# Patient Record
Sex: FEMALE | Race: WHITE | NOT HISPANIC OR LATINO
[De-identification: names, ages, dates, MRNs, and addresses within clinical notes are randomized per-mention and may not be internally consistent; named-entity substitution may affect disease eponyms.]

---

## 2021-12-20 ENCOUNTER — APPOINTMENT (OUTPATIENT)
Dept: NEUROSURGERY | Facility: CLINIC | Age: 31
End: 2021-12-20
Payer: COMMERCIAL

## 2021-12-20 VITALS
OXYGEN SATURATION: 99 % | DIASTOLIC BLOOD PRESSURE: 87 MMHG | SYSTOLIC BLOOD PRESSURE: 126 MMHG | HEIGHT: 61 IN | WEIGHT: 150 LBS | TEMPERATURE: 98.3 F | HEART RATE: 88 BPM | BODY MASS INDEX: 28.32 KG/M2

## 2021-12-20 DIAGNOSIS — Z78.9 OTHER SPECIFIED HEALTH STATUS: ICD-10-CM

## 2021-12-20 DIAGNOSIS — Z72.0 TOBACCO USE: ICD-10-CM

## 2021-12-20 DIAGNOSIS — Z80.3 FAMILY HISTORY OF MALIGNANT NEOPLASM OF BREAST: ICD-10-CM

## 2021-12-20 DIAGNOSIS — Z87.891 PERSONAL HISTORY OF NICOTINE DEPENDENCE: ICD-10-CM

## 2021-12-20 PROCEDURE — 99205 OFFICE O/P NEW HI 60 MIN: CPT

## 2021-12-20 RX ORDER — CYCLOBENZAPRINE HYDROCHLORIDE 7.5 MG/1
TABLET, FILM COATED ORAL
Refills: 0 | Status: ACTIVE | COMMUNITY

## 2021-12-20 NOTE — REASON FOR VISIT
[New Patient Visit] : a new patient visit [Referred By: _________] : Patient was referred by PHILOMENA

## 2021-12-20 NOTE — REVIEW OF SYSTEMS
[Leg Weakness] : leg weakness [Numbness] : numbness [Tingling] : tingling [Abnormal Sensation] : an abnormal sensation [Difficulty Walking] : difficulty walking [Anxiety] : anxiety [Depression] : depression [Negative] : Heme/Lymph

## 2021-12-22 ENCOUNTER — NON-APPOINTMENT (OUTPATIENT)
Age: 31
End: 2021-12-22

## 2021-12-22 NOTE — HISTORY OF PRESENT ILLNESS
[FreeTextEntry1] : "Sacral chordoma" [de-identified] : Hawa Booth is a pleasant right handed 31 year old lady who presents for consultation regarding lumbar spine lesion.  Pt states that she fell while going up stairs last October and hit her face.  Pt thinks that she lost consciousness for a brief period.  She noticed that she had left lower back pain, left buttock pain, left leg weakness and numbness after the fall.\par \par She is under the care of Neurologist Dr. Workman - Neurologist and is taking Cyclobenzaprine prn for pain 6/10.  Pt ambulates with cane assist.\par \par Pt has been doing physical therapy and reports that the pain is improving.\par \par Denies pain shooting down the leg.\par \par No biopsy of the lesion done.\par \par Current pain is related to high intensity zone. tear in lumbar spine.

## 2021-12-22 NOTE — ASSESSMENT
[FreeTextEntry1] : IMPRESSION:\par 1. Lumbar spine lesion found on imaging after fall in October 2021.\par \par \par \par PLAN:\par 1. Biopsy with Dr. Zhao for diagnosis.\par 2. Okay to continue with PT sessions - advised to use common sense with activities.\par 3. F/U after pathology result is available.\par 4. Advised pt not to take any NSAids or Aspirin 10 days prior to biopsy with Dr. Zhao.  She will take Tylenol prn.

## 2021-12-22 NOTE — PHYSICAL EXAM
[General Appearance - Alert] : alert [General Appearance - In No Acute Distress] : in no acute distress [General Appearance - Well Nourished] : well nourished [Oriented To Time, Place, And Person] : oriented to person, place, and time [Impaired Insight] : insight and judgment were intact [Affect] : the affect was normal [Memory Recent] : recent memory was not impaired [Person] : oriented to person [Place] : oriented to place [Time] : oriented to time [Cranial Nerves Optic (II)] : visual acuity intact bilaterally,  pupils equal round and reactive to light [Cranial Nerves Oculomotor (III)] : extraocular motion intact [Cranial Nerves Trigeminal (V)] : facial sensation intact symmetrically [Cranial Nerves Facial (VII)] : face symmetrical [Cranial Nerves Vestibulocochlear (VIII)] : hearing was intact bilaterally [Cranial Nerves Glossopharyngeal (IX)] : tongue and palate midline [Cranial Nerves Accessory (XI - Cranial And Spinal)] : head turning and shoulder shrug symmetric [Cranial Nerves Hypoglossal (XII)] : there was no tongue deviation with protrusion [Motor Handedness Right-Handed] : the patient is right hand dominant [Sclera] : the sclera and conjunctiva were normal [PERRL With Normal Accommodation] : pupils were equal in size, round, reactive to light, with normal accommodation [Extraocular Movements] : extraocular movements were intact [Outer Ear] : the ears and nose were normal in appearance [Hearing Threshold Finger Rub Not [place]] : hearing was normal [Neck Appearance] : the appearance of the neck was normal [Respiration, Rhythm And Depth] : normal respiratory rhythm and effort [Exaggerated Use Of Accessory Muscles For Inspiration] : no accessory muscle use [Heart Rate And Rhythm] : heart rate was normal and rhythm regular [Involuntary Movements] : no involuntary movements were seen [Skin Color & Pigmentation] : normal skin color and pigmentation [] : no rash [FreeTextEntry6] : ambulates with cane assist [FreeTextEntry1] : ambulates with cane assist

## 2021-12-27 ENCOUNTER — APPOINTMENT (OUTPATIENT)
Dept: INTERVENTIONAL RADIOLOGY/VASCULAR | Facility: CLINIC | Age: 31
End: 2021-12-27

## 2022-01-18 ENCOUNTER — APPOINTMENT (OUTPATIENT)
Dept: INTERVENTIONAL RADIOLOGY/VASCULAR | Facility: CLINIC | Age: 32
End: 2022-01-18

## 2022-03-28 ENCOUNTER — APPOINTMENT (OUTPATIENT)
Dept: INTERVENTIONAL RADIOLOGY/VASCULAR | Facility: CLINIC | Age: 32
End: 2022-03-28
Payer: COMMERCIAL

## 2022-03-28 DIAGNOSIS — Z92.29 PERSONAL HISTORY OF OTHER DRUG THERAPY: ICD-10-CM

## 2022-03-28 DIAGNOSIS — Z01.818 ENCOUNTER FOR OTHER PREPROCEDURAL EXAMINATION: ICD-10-CM

## 2022-03-28 DIAGNOSIS — Z86.16 PERSONAL HISTORY OF COVID-19: ICD-10-CM

## 2022-03-28 DIAGNOSIS — R93.89 ABNORMAL FINDINGS ON DIAGNOSTIC IMAGING OF OTHER SPECIFIED BODY STRUCTURES: ICD-10-CM

## 2022-03-28 PROCEDURE — 99443: CPT

## 2022-03-28 RX ORDER — QUETIAPINE 200 MG/1
200 TABLET, FILM COATED ORAL
Refills: 0 | Status: ACTIVE | COMMUNITY

## 2022-03-28 RX ORDER — PANTOPRAZOLE SODIUM 40 MG/1
40 GRANULE, DELAYED RELEASE ORAL
Refills: 0 | Status: ACTIVE | COMMUNITY

## 2022-04-20 ENCOUNTER — RESULT REVIEW (OUTPATIENT)
Age: 32
End: 2022-04-20

## 2022-04-20 ENCOUNTER — OUTPATIENT (OUTPATIENT)
Dept: OUTPATIENT SERVICES | Facility: HOSPITAL | Age: 32
LOS: 1 days | End: 2022-04-20
Payer: COMMERCIAL

## 2022-04-20 ENCOUNTER — TRANSCRIPTION ENCOUNTER (OUTPATIENT)
Age: 32
End: 2022-04-20

## 2022-04-20 VITALS
DIASTOLIC BLOOD PRESSURE: 93 MMHG | HEART RATE: 96 BPM | HEIGHT: 61 IN | SYSTOLIC BLOOD PRESSURE: 123 MMHG | RESPIRATION RATE: 18 BRPM | TEMPERATURE: 98 F | WEIGHT: 149.91 LBS | OXYGEN SATURATION: 99 %

## 2022-04-20 VITALS
RESPIRATION RATE: 18 BRPM | DIASTOLIC BLOOD PRESSURE: 90 MMHG | OXYGEN SATURATION: 99 % | SYSTOLIC BLOOD PRESSURE: 121 MMHG | HEART RATE: 85 BPM

## 2022-04-20 DIAGNOSIS — Z98.89 OTHER SPECIFIED POSTPROCEDURAL STATES: Chronic | ICD-10-CM

## 2022-04-20 DIAGNOSIS — M89.9 DISORDER OF BONE, UNSPECIFIED: ICD-10-CM

## 2022-04-20 PROCEDURE — 88341 IMHCHEM/IMCYTCHM EA ADD ANTB: CPT

## 2022-04-20 PROCEDURE — 20225 BONE BIOPSY TROCAR/NDL DEEP: CPT | Mod: 53

## 2022-04-20 PROCEDURE — 88342 IMHCHEM/IMCYTCHM 1ST ANTB: CPT

## 2022-04-20 PROCEDURE — 20225 BONE BIOPSY TROCAR/NDL DEEP: CPT

## 2022-04-20 PROCEDURE — 77012 CT SCAN FOR NEEDLE BIOPSY: CPT

## 2022-04-20 PROCEDURE — 88341 IMHCHEM/IMCYTCHM EA ADD ANTB: CPT | Mod: 26

## 2022-04-20 PROCEDURE — 77012 CT SCAN FOR NEEDLE BIOPSY: CPT | Mod: 26

## 2022-04-20 PROCEDURE — 88342 IMHCHEM/IMCYTCHM 1ST ANTB: CPT | Mod: 26

## 2022-04-20 PROCEDURE — C1830: CPT

## 2022-04-20 RX ORDER — HYDROMORPHONE HYDROCHLORIDE 2 MG/ML
1 INJECTION INTRAMUSCULAR; INTRAVENOUS; SUBCUTANEOUS
Refills: 0 | Status: DISCONTINUED | OUTPATIENT
Start: 2022-04-20 | End: 2022-04-20

## 2022-04-20 RX ORDER — ONDANSETRON 8 MG/1
4 TABLET, FILM COATED ORAL ONCE
Refills: 0 | Status: DISCONTINUED | OUTPATIENT
Start: 2022-04-20 | End: 2022-05-04

## 2022-04-20 RX ORDER — HYDROMORPHONE HYDROCHLORIDE 2 MG/ML
0.5 INJECTION INTRAMUSCULAR; INTRAVENOUS; SUBCUTANEOUS
Refills: 0 | Status: DISCONTINUED | OUTPATIENT
Start: 2022-04-20 | End: 2022-04-20

## 2022-04-20 RX ADMIN — HYDROMORPHONE HYDROCHLORIDE 1 MILLIGRAM(S): 2 INJECTION INTRAMUSCULAR; INTRAVENOUS; SUBCUTANEOUS at 11:45

## 2022-04-20 RX ADMIN — HYDROMORPHONE HYDROCHLORIDE 1 MILLIGRAM(S): 2 INJECTION INTRAMUSCULAR; INTRAVENOUS; SUBCUTANEOUS at 12:02

## 2022-04-20 NOTE — ASU DISCHARGE PLAN (ADULT/PEDIATRIC) - NURSING INSTRUCTIONS
Please feel free to contact us at [phone] if any problems arise. After 6PM, Monday through Friday, on weekends and on holidays, please call [phone] and ask for the radiology resident on call to be paged.

## 2022-04-20 NOTE — ASU DISCHARGE PLAN (ADULT/PEDIATRIC) - NS MD DC FALL RISK RISK
For information on Fall & Injury Prevention, visit: [email or web address] OR  [email or web address] OR  https://www.cdc.gov/steadi/patient.html

## 2022-04-20 NOTE — ASU PATIENT PROFILE, ADULT - FALL HARM RISK - UNIVERSAL INTERVENTIONS
Bed in lowest position, wheels locked, appropriate side rails in place/Call bell, personal items and telephone in reach/Instruct patient to call for assistance before getting out of bed or chair/Non-slip footwear when patient is out of bed/[place] to call system/Physically safe environment - no spills, clutter or unnecessary equipment/Purposeful Proactive Rounding/Room/bathroom lighting operational, light cord in reach

## 2022-04-20 NOTE — ASU DISCHARGE PLAN (ADULT/PEDIATRIC) - ASU DC SPECIAL INSTRUCTIONSFT
Biopsy Discharge    Discharge Instructions  - You have had a biopsy of lumbosacral bone.   - You may shower in 24 hours. No soaking or swimming until the site is completely healed.  - Keep the area covered and dry for the next 24 hours.  - Do not perform any heavy lifting for the next few days or until the site is healed.  - You may resume your normal diet.  - You may resume your normal medications however you should wait 48 hours before restarting aspirin, plavix, or blood thinners.  - It is normal to experience some pain over the site for the next few days. You may take apply ice to the area (20 minutes on, 20 minutes off) and take Tylenol for that pain. Do not take more frequently than every 6 hours and do not exceed more than 3000mg of Tylenol in a 24 hour period.    - You were given conscious sedation which may make you drowsy, therefore you need someone to stay with you until the morning following the procedure.  - Do not drive, engage in heavy lifting or strenuous activity, or drink any alcoholic beverages for the next 24 hours.   - You may resume normal activity in 24 hours.    Notify your primary physician and/or Interventional Radiology IMMEDIATELY if you experience any of the following       - Fever of 101F or 38C       - Chills or Rigors/ Shakes       - Swelling and/or Redness in the area around the biopsy site       - Worsening Pain       - Blood soaked bandages or worsening bleeding       - Lightheadedness and/or dizziness upon standing       - Chest Pain/ Tightness       - Shortness of Breath       - Difficulty walking    If you have a problem that you believe requires IMMEDIATE attention, please go to your NEAREST Emergency Room. If you believe your problem can safely wait until you speak to a physician, please call Interventional Radiology for any concerns.    During Normal Weekday Business Hours- You can contact the Interventional Radiology department during normal business hours via telephone.  During Evenings and Weekends- If you need to contact Interventional Radiology during off hours, do so by calling the hospital and requesting to be connected to the Interventional Radiologist on call.

## 2022-04-25 DIAGNOSIS — M89.9 DISORDER OF BONE, UNSPECIFIED: ICD-10-CM

## 2022-05-02 ENCOUNTER — TRANSCRIPTION ENCOUNTER (OUTPATIENT)
Age: 32
End: 2022-05-02

## 2022-05-04 LAB — NON-GYNECOLOGICAL CYTOLOGY STUDY: SIGNIFICANT CHANGE UP

## 2022-05-05 ENCOUNTER — APPOINTMENT (OUTPATIENT)
Dept: NEUROSURGERY | Facility: CLINIC | Age: 32
End: 2022-05-05

## 2022-05-16 ENCOUNTER — APPOINTMENT (OUTPATIENT)
Dept: NEUROSURGERY | Facility: CLINIC | Age: 32
End: 2022-05-16
Payer: SELF-PAY

## 2022-05-16 ENCOUNTER — OUTPATIENT (OUTPATIENT)
Dept: OUTPATIENT SERVICES | Facility: HOSPITAL | Age: 32
LOS: 1 days | End: 2022-05-16
Payer: COMMERCIAL

## 2022-05-16 VITALS
DIASTOLIC BLOOD PRESSURE: 94 MMHG | HEART RATE: 89 BPM | RESPIRATION RATE: 17 BRPM | HEIGHT: 60.98 IN | WEIGHT: 149.91 LBS | TEMPERATURE: 98 F | OXYGEN SATURATION: 99 % | SYSTOLIC BLOOD PRESSURE: 130 MMHG

## 2022-05-16 VITALS
OXYGEN SATURATION: 99 % | BODY MASS INDEX: 28.32 KG/M2 | HEART RATE: 92 BPM | DIASTOLIC BLOOD PRESSURE: 88 MMHG | TEMPERATURE: 98.3 F | HEIGHT: 61 IN | WEIGHT: 150 LBS | SYSTOLIC BLOOD PRESSURE: 143 MMHG

## 2022-05-16 DIAGNOSIS — C41.4 MALIGNANT NEOPLASM OF PELVIC BONES, SACRUM AND COCCYX: ICD-10-CM

## 2022-05-16 DIAGNOSIS — Z01.818 ENCOUNTER FOR OTHER PREPROCEDURAL EXAMINATION: ICD-10-CM

## 2022-05-16 DIAGNOSIS — Z98.89 OTHER SPECIFIED POSTPROCEDURAL STATES: Chronic | ICD-10-CM

## 2022-05-16 PROCEDURE — 85027 COMPLETE CBC AUTOMATED: CPT

## 2022-05-16 PROCEDURE — 86850 RBC ANTIBODY SCREEN: CPT

## 2022-05-16 PROCEDURE — 80048 BASIC METABOLIC PNL TOTAL CA: CPT

## 2022-05-16 PROCEDURE — 86901 BLOOD TYPING SEROLOGIC RH(D): CPT

## 2022-05-16 PROCEDURE — G0463: CPT

## 2022-05-16 PROCEDURE — 87640 STAPH A DNA AMP PROBE: CPT

## 2022-05-16 PROCEDURE — 99213 OFFICE O/P EST LOW 20 MIN: CPT

## 2022-05-16 PROCEDURE — 87641 MR-STAPH DNA AMP PROBE: CPT

## 2022-05-16 PROCEDURE — 86900 BLOOD TYPING SEROLOGIC ABO: CPT

## 2022-05-16 RX ORDER — CEFAZOLIN SODIUM 1 G
2000 VIAL (EA) INJECTION ONCE
Refills: 0 | Status: DISCONTINUED | OUTPATIENT
Start: 2022-06-02 | End: 2022-06-03

## 2022-05-16 RX ORDER — ALPRAZOLAM 0.25 MG
0 TABLET ORAL
Qty: 0 | Refills: 0 | DISCHARGE

## 2022-05-16 NOTE — H&P PST ADULT - FALL HARM RISK - UNIVERSAL INTERVENTIONS
Bed in lowest position, wheels locked, appropriate side rails in place/Call bell, personal items and telephone in reach/Instruct patient to call for assistance before getting out of bed or chair/Non-slip footwear when patient is out of bed/[place] to call system/Physically safe environment - no spills, clutter or unnecessary equipment/Purposeful Proactive Rounding/Room/bathroom lighting operational, light cord in reach

## 2022-05-16 NOTE — H&P PST ADULT - NSICDXPASTMEDICALHX_GEN_ALL_CORE_FT
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

## 2022-05-16 NOTE — H&P PST ADULT - NS PRO LAST MENSTRUAL DATE
3 weeks ago Niacinamide Counseling: I recommended taking niacin or niacinamide, also know as vitamin B3, twice daily. Recent evidence suggests that taking vitamin B3 (500 mg twice daily) can reduce the risk of actinic keratoses and non-melanoma skin cancers. Side effects of vitamin B3 include flushing and headache.

## 2022-05-16 NOTE — H&P PST ADULT - ASSESSMENT
CAPRINI SCORE [CLOT]    AGE RELATED RISK FACTORS                                                       MOBILITY RELATED FACTORS  [ ] Age 41-60 years                                            (1 Point)                  [ ] Bed rest                                                        (1 Point)  [ ] Age: 61-74 years                                           (2 Points)                 [ ] Plaster cast                                                   (2 Points)  [ ] Age= 75 years                                              (3 Points)                 [ ] Bed bound for more than 72 hours                 (2 Points)    DISEASE RELATED RISK FACTORS                                               GENDER SPECIFIC FACTORS  [ ] Edema in the lower extremities                       (1 Point)                  [ ] Pregnancy                                                     (1 Point)  [ ] Varicose veins                                               (1 Point)                  [ ] Post-partum < 6 weeks                                   (1 Point)             [ x] BMI > 25 Kg/m2                                            (1 Point)                  [ ] Hormonal therapy  or oral contraception          (1 Point)                 [ ] Sepsis (in the previous month)                        (1 Point)                  [ ] History of pregnancy complications                 (1 point)  [ ] Pneumonia or serious lung disease                                               [ ] Unexplained or recurrent                     (1 Point)           (in the previous month)                               (1 Point)  [ ] Abnormal pulmonary function test                     (1 Point)                 SURGERY RELATED RISK FACTORS  [ ] Acute myocardial infarction                              (1 Point)                 [ ]  Section                                             (1 Point)  [ ] Congestive heart failure (in the previous month)  (1 Point)               [ ] Minor surgery                                                  (1 Point)   [ ] Inflammatory bowel disease                             (1 Point)                 [ ] Arthroscopic surgery                                        (2 Points)  [ ] Central venous access                                      (2 Points)                [ x] General surgery lasting more than 45 minutes   (2 Points)       [ ] Stroke (in the previous month)                          (5 Points)               [ ] Elective arthroplasty                                         (5 Points)                                                                                                                                               HEMATOLOGY RELATED FACTORS                                                 TRAUMA RELATED RISK FACTORS  [ ] Prior episodes of VTE                                     (3 Points)                [ ] Fracture of the hip, pelvis, or leg                       (5 Points)  [ ] Positive family history for VTE                         (3 Points)                 [ ] Acute spinal cord injury (in the previous month)  (5 Points)  [ ] Prothrombin 92373 A                                     (3 Points)                 [ ] Paralysis  (less than 1 month)                             (5 Points)  [ ] Factor V Leiden                                             (3 Points)                  [ ] Multiple Trauma within 1 month                        (5 Points)  [ ] Lupus anticoagulants                                     (3 Points)                                                           [ ] Anticardiolipin antibodies                               (3 Points)                                                       [ ] High homocysteine in the blood                      (3 Points)                                             [ ] Other congenital or acquired thrombophilia      (3 Points)                                                [ ] Heparin induced thrombocytopenia                  (3 Points)                                          Total Score [   3       ]    Caprini Score 0 - 2:  Low Risk, No VTE Prophylaxis required for most patients, encourage ambulation  Caprini Score 3 - 6:  At Risk, pharmacologic VTE prophylaxis is indicated for most patients (in the absence of a contraindication)  Caprini Score Greater than or = 7:  High Risk, pharmacologic VTE prophylaxis is indicated for most patients (in the absence of a contraindication)

## 2022-05-16 NOTE — H&P PST ADULT - PROBLEM SELECTOR PLAN 1
mid sacral removal of tumor  resection with plastic closure  back reconstruction  with muscle flaps   placement of mesh

## 2022-05-18 ENCOUNTER — RESULT REVIEW (OUTPATIENT)
Age: 32
End: 2022-05-18

## 2022-05-18 PROBLEM — C41.4: Chronic | Status: ACTIVE | Noted: 2022-05-16

## 2022-05-18 PROBLEM — M51.26 OTHER INTERVERTEBRAL DISC DISPLACEMENT, LUMBAR REGION: Chronic | Status: ACTIVE | Noted: 2022-05-16
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
## 2022-05-18 PROBLEM — U07.1 COVID-19: Chronic | Status: ACTIVE | Noted: 2022-05-16

## 2022-05-18 NOTE — ASSESSMENT
[FreeTextEntry1] : Impression:31 year old female pre op for resection of sacral chordoma June 2, 2022.\par Patient and her mother asked appropriate questions regarding surgical procedure, prognosis, recovery and precautions.\par All questions were answered and patient and her mother state an appropriate understanding of answers given.\par \par All risks and benefits of the surgery were discussed in detail and included: bowel and bladder dysfunction, wound complications (infection, breakdown), nerve damage and bleeding.\par \par \par \par Plan:\par \par 1) Pre-op MRI without contrast (authorized)\par \par 2) Pre-op CT scan without contrast (Authorized)\par \par 3) Pre-op testing  scheduled for 5/16 2022 @6:30 PM\par \par 4) COVID testing scheduled for 5/28/2022

## 2022-05-18 NOTE — PHYSICAL EXAM
[General Appearance - Alert] : alert [General Appearance - In No Acute Distress] : in no acute distress [Oriented To Time, Place, And Person] : oriented to person, place, and time [Impaired Insight] : insight and judgment were intact [Affect] : the affect was normal [Person] : oriented to person [Place] : oriented to place [Time] : oriented to time [Short Term Intact] : short term memory intact [Remote Intact] : remote memory intact [Span Intact] : the attention span was normal [Concentration Intact] : normal concentrating ability [Fluency] : fluency intact [Comprehension] : comprehension intact [Current Events] : adequate knowledge of current events [Past History] : adequate knowledge of personal past history [Vocabulary] : adequate range of vocabulary [Cranial Nerves Optic (II)] : visual acuity intact bilaterally,  pupils equal round and reactive to light [Cranial Nerves Oculomotor (III)] : extraocular motion intact [Cranial Nerves Trigeminal (V)] : facial sensation intact symmetrically [Cranial Nerves Facial (VII)] : face symmetrical [Cranial Nerves Vestibulocochlear (VIII)] : hearing was intact bilaterally [Cranial Nerves Glossopharyngeal (IX)] : tongue and palate midline [Cranial Nerves Accessory (XI - Cranial And Spinal)] : head turning and shoulder shrug symmetric [Cranial Nerves Hypoglossal (XII)] : there was no tongue deviation with protrusion [Motor Tone] : muscle tone was normal in all four extremities [Motor Strength] : muscle strength was normal in all four extremities [No Muscle Atrophy] : normal bulk in all four extremities [Sensation Tactile Decrease] : light touch was intact [Abnormal Walk] : normal gait [Balance] : balance was intact [2+] : Patella left 2+ [No Visual Abnormalities] : no visible abnormailities [No Tenderness to Palpation] : no spine tenderness on palpation [Full ROM] : full ROM [No Pain with ROM] : no pain with motion in any direction [] : negative on the right [Normal] : normal [Intact] : all reflexes within normal limits bilaterally [Past-pointing] : there was no past-pointing [Tremor] : no tremor present [[place]'s] : neck flexion did not produce tingling down the spine/arms [Spurling's - Opposite Side] : Negative Spurling's on opposite side [Spurling's Same Side] : Negative Spurling's on same side [Straight-Leg Raise Test - Left] : straight leg raise of the left leg was negative [Straight-Leg Raise Test - Right] : straight leg raise  of the right leg was negative

## 2022-05-18 NOTE — ADDENDUM
[FreeTextEntry1] : Given the final pathology: documented as chordoma, Dr Thompson discussed the planned surgical procedure as perfected by him. The agreed upon complex procedure will be a mid sacral removal of tumor with en-bloc with total resection including wound closure by plastic surgery and neuro monitoring.

## 2022-05-18 NOTE — REASON FOR VISIT
[Follow-Up: _____] : a [unfilled] follow-up visit [Parent] : parent [FreeTextEntry1] : 31 year female here today to discuss surgical options of her lumbar spine lesion. This was discovered after a fall last October, in which she lost consciousness for a brief period of time but has residual left sided low back pain and left leg weakness.\par \par She has had all of the necessary testing and comes back for surgical planning.\par \par Today she notes no residual effects. No numbness no tingling and no pain.\par \par \par

## 2022-05-23 ENCOUNTER — APPOINTMENT (OUTPATIENT)
Dept: CT IMAGING | Facility: CLINIC | Age: 32
End: 2022-05-23
Payer: COMMERCIAL

## 2022-05-23 ENCOUNTER — APPOINTMENT (OUTPATIENT)
Dept: MRI IMAGING | Facility: CLINIC | Age: 32
End: 2022-05-23
Payer: COMMERCIAL

## 2022-05-23 PROCEDURE — 72128 CT CHEST SPINE W/O DYE: CPT

## 2022-05-23 PROCEDURE — 72125 CT NECK SPINE W/O DYE: CPT

## 2022-05-23 PROCEDURE — A9585: CPT | Mod: JW

## 2022-05-23 PROCEDURE — 72131 CT LUMBAR SPINE W/O DYE: CPT

## 2022-05-23 PROCEDURE — 71250 CT THORAX DX C-: CPT

## 2022-05-23 PROCEDURE — 72197 MRI PELVIS W/O & W/DYE: CPT

## 2022-05-25 RX ORDER — METRONIDAZOLE 500 MG/1
500 TABLET ORAL
Qty: 3 | Refills: 0 | Status: ACTIVE | COMMUNITY
Start: 2022-05-25 | End: 1900-01-01

## 2022-05-25 RX ORDER — NEOMYCIN SULFATE 500 MG/1
500 TABLET ORAL
Qty: 6 | Refills: 0 | Status: ACTIVE | COMMUNITY
Start: 2022-05-25 | End: 1900-01-01

## 2022-05-25 RX ORDER — POLYETHYLENE GLYCOL 3350 17 G/17G
17 POWDER, FOR SOLUTION ORAL
Qty: 8 | Refills: 0 | Status: ACTIVE | COMMUNITY
Start: 2022-05-25 | End: 1900-01-01

## 2022-05-29 ENCOUNTER — NON-APPOINTMENT (OUTPATIENT)
Age: 32
End: 2022-05-29

## 2022-06-01 ENCOUNTER — NON-APPOINTMENT (OUTPATIENT)
Age: 32
End: 2022-06-01

## 2022-06-02 ENCOUNTER — RESULT REVIEW (OUTPATIENT)
Age: 32
End: 2022-06-02

## 2022-06-02 ENCOUNTER — APPOINTMENT (OUTPATIENT)
Dept: NEUROSURGERY | Facility: CLINIC | Age: 32
End: 2022-06-02

## 2022-06-02 ENCOUNTER — APPOINTMENT (OUTPATIENT)
Dept: NEUROSURGERY | Facility: HOSPITAL | Age: 32
End: 2022-06-02
Payer: COMMERCIAL

## 2022-06-02 ENCOUNTER — APPOINTMENT (OUTPATIENT)
Dept: PLASTIC SURGERY | Facility: HOSPITAL | Age: 32
End: 2022-06-02

## 2022-06-02 ENCOUNTER — TRANSCRIPTION ENCOUNTER (OUTPATIENT)
Age: 32
End: 2022-06-02

## 2022-06-02 ENCOUNTER — INPATIENT (INPATIENT)
Facility: HOSPITAL | Age: 32
LOS: 6 days | Discharge: HOME | DRG: 460 | End: 2022-06-09
Attending: NEUROLOGICAL SURGERY | Admitting: NEUROLOGICAL SURGERY
Payer: COMMERCIAL

## 2022-06-02 VITALS
SYSTOLIC BLOOD PRESSURE: 119 MMHG | OXYGEN SATURATION: 98 % | HEART RATE: 96 BPM | WEIGHT: 149.91 LBS | TEMPERATURE: 98 F | DIASTOLIC BLOOD PRESSURE: 88 MMHG | RESPIRATION RATE: 18 BRPM | HEIGHT: 60.98 IN

## 2022-06-02 DIAGNOSIS — C41.4 MALIGNANT NEOPLASM OF PELVIC BONES, SACRUM AND COCCYX: ICD-10-CM

## 2022-06-02 DIAGNOSIS — Z98.89 OTHER SPECIFIED POSTPROCEDURAL STATES: Chronic | ICD-10-CM

## 2022-06-02 LAB
ANION GAP SERPL CALC-SCNC: 13 MMOL/L — SIGNIFICANT CHANGE UP (ref 5–17)
BASOPHILS # BLD AUTO: 0.01 K/UL — SIGNIFICANT CHANGE UP (ref 0–0.2)
BASOPHILS NFR BLD AUTO: 0.1 % — SIGNIFICANT CHANGE UP (ref 0–2)
BUN SERPL-MCNC: 6 MG/DL — LOW (ref 7–23)
CALCIUM SERPL-MCNC: 8.6 MG/DL — SIGNIFICANT CHANGE UP (ref 8.4–10.5)
CHLORIDE SERPL-SCNC: 103 MMOL/L — SIGNIFICANT CHANGE UP (ref 96–108)
CO2 SERPL-SCNC: 21 MMOL/L — LOW (ref 22–31)
CREAT SERPL-MCNC: 0.65 MG/DL — SIGNIFICANT CHANGE UP (ref 0.5–1.3)
EGFR: 121 ML/MIN/1.73M2 — SIGNIFICANT CHANGE UP
EOSINOPHIL # BLD AUTO: 0 K/UL — SIGNIFICANT CHANGE UP (ref 0–0.5)
EOSINOPHIL NFR BLD AUTO: 0 % — SIGNIFICANT CHANGE UP (ref 0–6)
GAS PNL BLDA: SIGNIFICANT CHANGE UP
GLUCOSE SERPL-MCNC: 130 MG/DL — HIGH (ref 70–99)
HCT VFR BLD CALC: 27.5 % — LOW (ref 34.5–45)
HGB BLD-MCNC: 9.2 G/DL — LOW (ref 11.5–15.5)
IMM GRANULOCYTES NFR BLD AUTO: 0.6 % — SIGNIFICANT CHANGE UP (ref 0–1.5)
LYMPHOCYTES # BLD AUTO: 0.88 K/UL — LOW (ref 1–3.3)
LYMPHOCYTES # BLD AUTO: 6.5 % — LOW (ref 13–44)
MCHC RBC-ENTMCNC: 30.8 PG — SIGNIFICANT CHANGE UP (ref 27–34)
MCHC RBC-ENTMCNC: 33.5 GM/DL — SIGNIFICANT CHANGE UP (ref 32–36)
MCV RBC AUTO: 92 FL — SIGNIFICANT CHANGE UP (ref 80–100)
MONOCYTES # BLD AUTO: 0.34 K/UL — SIGNIFICANT CHANGE UP (ref 0–0.9)
MONOCYTES NFR BLD AUTO: 2.5 % — SIGNIFICANT CHANGE UP (ref 2–14)
NEUTROPHILS # BLD AUTO: 12.27 K/UL — HIGH (ref 1.8–7.4)
NEUTROPHILS NFR BLD AUTO: 90.3 % — HIGH (ref 43–77)
NRBC # BLD: 0 /100 WBCS — SIGNIFICANT CHANGE UP (ref 0–0)
PLATELET # BLD AUTO: 319 K/UL — SIGNIFICANT CHANGE UP (ref 150–400)
POTASSIUM SERPL-MCNC: 4.3 MMOL/L — SIGNIFICANT CHANGE UP (ref 3.5–5.3)
POTASSIUM SERPL-SCNC: 4.3 MMOL/L — SIGNIFICANT CHANGE UP (ref 3.5–5.3)
RBC # BLD: 2.99 M/UL — LOW (ref 3.8–5.2)
RBC # FLD: 12.3 % — SIGNIFICANT CHANGE UP (ref 10.3–14.5)
SODIUM SERPL-SCNC: 137 MMOL/L — SIGNIFICANT CHANGE UP (ref 135–145)
WBC # BLD: 13.58 K/UL — HIGH (ref 3.8–10.5)
WBC # FLD AUTO: 13.58 K/UL — HIGH (ref 3.8–10.5)

## 2022-06-02 PROCEDURE — 72020 X-RAY EXAM OF SPINE 1 VIEW: CPT | Mod: 26

## 2022-06-02 PROCEDURE — 61783 SCAN PROC SPINAL: CPT

## 2022-06-02 PROCEDURE — 64999H: CUSTOM | Mod: 82,22

## 2022-06-02 PROCEDURE — 64999H: CUSTOM | Mod: 22

## 2022-06-02 PROCEDURE — 27280 ARTHR SI JT OPN B1GRF INSTRM: CPT | Mod: 50

## 2022-06-02 PROCEDURE — 88311 DECALCIFY TISSUE: CPT | Mod: 26

## 2022-06-02 PROCEDURE — 14302 TIS TRNFR ADDL 30 SQ CM: CPT

## 2022-06-02 PROCEDURE — 17999 UNLISTD PX SKN MUC MEMB SUBQ: CPT

## 2022-06-02 PROCEDURE — 22848 INSERT PELV FIXATION DEVICE: CPT | Mod: 82,59

## 2022-06-02 PROCEDURE — 99291 CRITICAL CARE FIRST HOUR: CPT | Mod: 25

## 2022-06-02 PROCEDURE — 15734 MUSCLE-SKIN GRAFT TRUNK: CPT | Mod: 59

## 2022-06-02 PROCEDURE — 88305 TISSUE EXAM BY PATHOLOGIST: CPT | Mod: 26

## 2022-06-02 PROCEDURE — 27280 ARTHR SI JT OPN B1GRF INSTRM: CPT | Mod: 82,50

## 2022-06-02 PROCEDURE — 22848 INSERT PELV FIXATION DEVICE: CPT | Mod: 59

## 2022-06-02 PROCEDURE — 14301 TIS TRNFR ANY 30.1-60 SQ CM: CPT

## 2022-06-02 PROCEDURE — 61783 SCAN PROC SPINAL: CPT | Mod: 82

## 2022-06-02 PROCEDURE — 88309 TISSUE EXAM BY PATHOLOGIST: CPT | Mod: 26

## 2022-06-02 DEVICE — ROD PRE-LORDOSED W/LINE 75MM: Type: IMPLANTABLE DEVICE | Status: FUNCTIONAL

## 2022-06-02 DEVICE — HEMASORB ABS BONE PUTTY PLUS: Type: IMPLANTABLE DEVICE | Status: FUNCTIONAL

## 2022-06-02 DEVICE — SURGIFOAM PAD 8CM X 12.5CM X 10MM (100): Type: IMPLANTABLE DEVICE | Status: FUNCTIONAL

## 2022-06-02 DEVICE — FLOSEAL FAST PREP 10ML: Type: IMPLANTABLE DEVICE | Status: FUNCTIONAL

## 2022-06-02 DEVICE — IMPLANTABLE DEVICE: Type: IMPLANTABLE DEVICE | Status: FUNCTIONAL

## 2022-06-02 DEVICE — KIT A-LINE 1LUM 20G X 12CM SAFE KIT: Type: IMPLANTABLE DEVICE | Status: FUNCTIONAL

## 2022-06-02 DEVICE — MAYFIELD SKULL PIN ADULT PLASTIC: Type: IMPLANTABLE DEVICE | Status: FUNCTIONAL

## 2022-06-02 DEVICE — GWIRE NITINOL BLUNT 1.45X490MM: Type: IMPLANTABLE DEVICE | Status: FUNCTIONAL

## 2022-06-02 DEVICE — CLIP APPLIER ETHICON LIGACLIP 9 3/8" MEDIUM: Type: IMPLANTABLE DEVICE | Status: FUNCTIONAL

## 2022-06-02 DEVICE — TACHOSIL 9.5 X 4.8CM: Type: IMPLANTABLE DEVICE | Status: FUNCTIONAL

## 2022-06-02 DEVICE — SET SCREW SINGLE INNER STRL: Type: IMPLANTABLE DEVICE | Status: FUNCTIONAL

## 2022-06-02 RX ORDER — ONDANSETRON 8 MG/1
4 TABLET, FILM COATED ORAL EVERY 6 HOURS
Refills: 0 | Status: DISCONTINUED | OUTPATIENT
Start: 2022-06-02 | End: 2022-06-09

## 2022-06-02 RX ORDER — METHOCARBAMOL 500 MG/1
500 TABLET, FILM COATED ORAL EVERY 8 HOURS
Refills: 0 | Status: DISCONTINUED | OUTPATIENT
Start: 2022-06-02 | End: 2022-06-06

## 2022-06-02 RX ORDER — QUETIAPINE FUMARATE 200 MG/1
50 TABLET, FILM COATED ORAL AT BEDTIME
Refills: 0 | Status: DISCONTINUED | OUTPATIENT
Start: 2022-06-02 | End: 2022-06-06

## 2022-06-02 RX ORDER — HYDROMORPHONE HYDROCHLORIDE 2 MG/ML
0.5 INJECTION INTRAMUSCULAR; INTRAVENOUS; SUBCUTANEOUS
Refills: 0 | Status: DISCONTINUED | OUTPATIENT
Start: 2022-06-02 | End: 2022-06-06

## 2022-06-02 RX ORDER — BUTORPHANOL TARTRATE 2 MG/ML
0.25 INJECTION, SOLUTION INTRAMUSCULAR; INTRAVENOUS EVERY 6 HOURS
Refills: 0 | Status: DISCONTINUED | OUTPATIENT
Start: 2022-06-02 | End: 2022-06-07

## 2022-06-02 RX ORDER — CEFAZOLIN SODIUM 1 G
2000 VIAL (EA) INJECTION EVERY 8 HOURS
Refills: 0 | Status: COMPLETED | OUTPATIENT
Start: 2022-06-03 | End: 2022-06-03

## 2022-06-02 RX ORDER — ONDANSETRON 8 MG/1
4 TABLET, FILM COATED ORAL EVERY 6 HOURS
Refills: 0 | Status: DISCONTINUED | OUTPATIENT
Start: 2022-06-02 | End: 2022-06-02

## 2022-06-02 RX ORDER — SODIUM CHLORIDE 9 MG/ML
3 INJECTION INTRAMUSCULAR; INTRAVENOUS; SUBCUTANEOUS EVERY 8 HOURS
Refills: 0 | Status: DISCONTINUED | OUTPATIENT
Start: 2022-06-02 | End: 2022-06-02

## 2022-06-02 RX ORDER — DIPHENHYDRAMINE HCL 50 MG
25 CAPSULE ORAL EVERY 4 HOURS
Refills: 0 | Status: DISCONTINUED | OUTPATIENT
Start: 2022-06-02 | End: 2022-06-09

## 2022-06-02 RX ORDER — NALOXONE HYDROCHLORIDE 4 MG/.1ML
0.1 SPRAY NASAL
Refills: 0 | Status: DISCONTINUED | OUTPATIENT
Start: 2022-06-02 | End: 2022-06-09

## 2022-06-02 RX ORDER — HYDROMORPHONE HYDROCHLORIDE 2 MG/ML
30 INJECTION INTRAMUSCULAR; INTRAVENOUS; SUBCUTANEOUS
Refills: 0 | Status: DISCONTINUED | OUTPATIENT
Start: 2022-06-02 | End: 2022-06-06

## 2022-06-02 RX ORDER — SODIUM CHLORIDE 9 MG/ML
1000 INJECTION INTRAMUSCULAR; INTRAVENOUS; SUBCUTANEOUS
Refills: 0 | Status: DISCONTINUED | OUTPATIENT
Start: 2022-06-02 | End: 2022-06-02

## 2022-06-02 RX ORDER — PANTOPRAZOLE SODIUM 20 MG/1
40 TABLET, DELAYED RELEASE ORAL
Refills: 0 | Status: DISCONTINUED | OUTPATIENT
Start: 2022-06-02 | End: 2022-06-09

## 2022-06-02 RX ORDER — LIDOCAINE HCL 20 MG/ML
0.2 VIAL (ML) INJECTION ONCE
Refills: 0 | Status: DISCONTINUED | OUTPATIENT
Start: 2022-06-02 | End: 2022-06-02

## 2022-06-02 RX ORDER — ACETAMINOPHEN 500 MG
1000 TABLET ORAL EVERY 8 HOURS
Refills: 0 | Status: DISCONTINUED | OUTPATIENT
Start: 2022-06-02 | End: 2022-06-03

## 2022-06-02 RX ORDER — CHLORHEXIDINE GLUCONATE 213 G/1000ML
1 SOLUTION TOPICAL ONCE
Refills: 0 | Status: DISCONTINUED | OUTPATIENT
Start: 2022-06-02 | End: 2022-06-02

## 2022-06-02 RX ADMIN — HYDROMORPHONE HYDROCHLORIDE 30 MILLILITER(S): 2 INJECTION INTRAMUSCULAR; INTRAVENOUS; SUBCUTANEOUS at 19:09

## 2022-06-02 RX ADMIN — Medication 50 MILLIGRAM(S): at 21:40

## 2022-06-02 RX ADMIN — ONDANSETRON 4 MILLIGRAM(S): 8 TABLET, FILM COATED ORAL at 23:46

## 2022-06-02 RX ADMIN — Medication 30 MILLILITER(S): at 18:38

## 2022-06-02 RX ADMIN — Medication 100 MILLIGRAM(S): at 23:46

## 2022-06-02 RX ADMIN — METHOCARBAMOL 500 MILLIGRAM(S): 500 TABLET, FILM COATED ORAL at 21:40

## 2022-06-02 RX ADMIN — ONDANSETRON 4 MILLIGRAM(S): 8 TABLET, FILM COATED ORAL at 18:01

## 2022-06-02 RX ADMIN — QUETIAPINE FUMARATE 50 MILLIGRAM(S): 200 TABLET, FILM COATED ORAL at 21:39

## 2022-06-02 RX ADMIN — Medication 1000 MILLIGRAM(S): at 21:40

## 2022-06-02 RX ADMIN — HYDROMORPHONE HYDROCHLORIDE 30 MILLILITER(S): 2 INJECTION INTRAMUSCULAR; INTRAVENOUS; SUBCUTANEOUS at 17:47

## 2022-06-02 RX ADMIN — Medication 1000 MILLIGRAM(S): at 22:10

## 2022-06-02 RX ADMIN — SODIUM CHLORIDE 75 MILLILITER(S): 9 INJECTION INTRAMUSCULAR; INTRAVENOUS; SUBCUTANEOUS at 18:01

## 2022-06-02 NOTE — PRE-ANESTHESIA EVALUATION ADULT - NSANTHPEFT_GEN_ALL_CORE
PHYSICAL EXAM:  GENERAL: NAD, well-developed/nourished and groomed, afebrile  CHEST/LUNG: Clear to auscultation bilaterally; No wheeze/rhonchi/rales  HEART: Regular rate and rhythm; No murmurs, rubs, or gallops  NEUROLOGY: AAO*3, non-focal

## 2022-06-02 NOTE — H&P ADULT - NSHPPHYSICALEXAM_GEN_ALL_CORE
CN II-XII intact. 5/5 motor strength in all four extremities, sensation intact to light touch throughout

## 2022-06-02 NOTE — DISCHARGE NOTE NURSING/CASE MANAGEMENT/SOCIAL WORK - NSDCPEFALRISK_GEN_ALL_CORE
For information on Fall & Injury Prevention, visit: [email or web address] OR  [email or web address] OR  https://www.cdc.gov/steadi/patient.html

## 2022-06-02 NOTE — PROGRESS NOTE ADULT - SUBJECTIVE AND OBJECTIVE BOX
NEUROCRITICAL CARE  EVENING NOTE    BRIEF SUMMARY:  31yFemale presented with Patient is a 31y old  Female who presents with a chief complaint of Sacral chordoma (02 Jun 2022 17:06)        VITALS/IMAGING/DATA/IVF FLUIDS/MEDICATIONS: [x] Reviewed    ALLERGIES: Allergies  catscan dye (Swelling)  IV Contrast (Hives; Rash)  Intolerances        EXAMINATION:  General: No acute distress  HEENT: Anicteric sclerae  Cardiac: [id number]  Lungs: Clear  Abdomen: Soft, non-tender, +BS  Extremities: No c/c/e  Skin/Incision Site: Clean, dry and intact  Neurologic: Awake, alert, fully oriented, follows commands, PERRL, VFFtc, EOMI, face symmetric, tongue midline, no drift, full strength, s1 distribution paresthesias

## 2022-06-02 NOTE — PROGRESS NOTE ADULT - SUBJECTIVE AND OBJECTIVE BOX
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 She has no neurologic deficits, including no numbness, weakness, bowel/bladder/sexual dysfunction. (02 Jun 2022 07:03)    On admission, the patient was:  GCS:  Romano-Ayoub:  modified Spence:  ICH score:  NIHSS:    *** HIGH RISK OF DVT PRESENT ON ADMISSION ***    VITALS:  T(C): , Max: 36.6 (06-02-22 @ 06:11)  HR:  (96 - 96)  BP:  (119/88 - 119/88)  ABP: --  RR:  (18 - 18)  SpO2:  (98% - 98%)  Wt(kg): --      LABS:  Na:   K:   Cl:   CO2:   BUN:   Cr:   Glu:     Hgb:   Hct:   WBC:   Plt:       IMAGING:   Recent imaging studies were reviewed.    MEDICATIONS:  acetaminophen     Tablet .. 1000 milliGRAM(s) Oral every 8 hours  butorphanol Injectable 0.25 milliGRAM(s) IV Push every 6 hours PRN  ceFAZolin   IVPB 2000 milliGRAM(s) IV Intermittent once  diphenhydrAMINE Injectable 25 milliGRAM(s) IV Push every 4 hours PRN  HYDROmorphone PCA (1 mG/mL) 30 milliLiter(s) PCA Continuous PCA Continuous  HYDROmorphone PCA (1 mG/mL) Rescue Clinician Bolus 0.5 milliGRAM(s) IV Push every 15 minutes PRN  methocarbamol 500 milliGRAM(s) Oral every 8 hours  naloxone Injectable 0.1 milliGRAM(s) IV Push every 3 minutes PRN  ondansetron   Disintegrating Tablet 4 milliGRAM(s) Oral every 6 hours  ondansetron Injectable 4 milliGRAM(s) IV Push every 6 hours PRN  ondansetron Injectable 4 milliGRAM(s) IV Push every 6 hours PRN  pantoprazole    Tablet 40 milliGRAM(s) Oral before breakfast  pregabalin 50 milliGRAM(s) Oral three times a day  QUEtiapine 50 milliGRAM(s) Oral at bedtime  sodium chloride 0.9%. 1000 milliLiter(s) IV Continuous <Continuous>    EXAMINATION:  General:  calm  HEENT:  MMM  Neuro:  awake, alert, oriented x 3, follows commands, moves all extremities  Cards:  RRR  Respiratory:  no respiratory distress  Adomen:  soft  Extremities:  no edema  Skin:  warm/dry HPI:  31F who was found to have a sacral lesion after sustaining a fall that has 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 She has no neurologic deficits, including no numbness, weakness, bowel/bladder/sexual dysfunction. (02 Jun 2022 07:03)    Day 0 post nerve sparing mid-sacrectomy for En Bloc chordoma resection, sacropelvic fusion, plastics closure  On admission, the patient was:  GCS: 15    VITALS:  T(C): , Max: 36.6 (06-02-22 @ 06:11)  HR:  (96 - 96)  BP:  (119/88 - 119/88)  ABP: --  RR:  (18 - 18)  SpO2:  (98% - 98%)  Wt(kg): --    IMAGING:   Recent imaging studies were reviewed.    MEDICATIONS:  acetaminophen     Tablet .. 1000 milliGRAM(s) Oral every 8 hours  butorphanol Injectable 0.25 milliGRAM(s) IV Push every 6 hours PRN  ceFAZolin   IVPB 2000 milliGRAM(s) IV Intermittent once  diphenhydrAMINE Injectable 25 milliGRAM(s) IV Push every 4 hours PRN  HYDROmorphone PCA (1 mG/mL) 30 milliLiter(s) PCA Continuous PCA Continuous  HYDROmorphone PCA (1 mG/mL) Rescue Clinician Bolus 0.5 milliGRAM(s) IV Push every 15 minutes PRN  methocarbamol 500 milliGRAM(s) Oral every 8 hours  naloxone Injectable 0.1 milliGRAM(s) IV Push every 3 minutes PRN  ondansetron   Disintegrating Tablet 4 milliGRAM(s) Oral every 6 hours  ondansetron Injectable 4 milliGRAM(s) IV Push every 6 hours PRN  ondansetron Injectable 4 milliGRAM(s) IV Push every 6 hours PRN  pantoprazole    Tablet 40 milliGRAM(s) Oral before breakfast  pregabalin 50 milliGRAM(s) Oral three times a day  QUEtiapine 50 milliGRAM(s) Oral at bedtime  sodium chloride 0.9%. 1000 milliLiter(s) IV Continuous <Continuous>    EXAMINATION:  General:  calm  HEENT:  MMM  Neuro:  awake, alert, oriented x 3, follows commands, moves all extremities, no sensory deficits   Cards:  RRR  Respiratory:  no respiratory distress  Adomen:  soft  Extremities:  no edema  Skin:  warm/dry

## 2022-06-02 NOTE — PROGRESS NOTE ADULT - ASSESSMENT
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 She has no neurologic deficits, including no numbness, weakness, bowel/bladder/sexual dysfunction. (02 Jun 2022 07:03)  Day 0 post nerve sparing mid-sacrectomy for En Bloc chordoma resection, sacropelvic fusion, plastics closure    NEURO:    q1h neuro checks  CT Lumbosacral spine  Pain management  Pregabalin 50 mg TID  Care for the drains   Quetiapine 50 mg OD    CARDS:    -160    PULM:   RA    RENAL:    NS at 75 ml/hr     GASTRO:    advance as tolerated  Stress ulcer prophylaxis:  Protonix 40 mg OD    HEME:    monitor H/H    DVT prophylaxis: SCDs, hold anticoagulation, fresh post op    ENDO:    euglycemia    ID:    Monitor for fever  Perioperative ANCEF     Code status:  Full code  Disposition:  ICU    This patient was at high risk of neurologic deterioration and/or death due to:     Time spent:  45 minutes

## 2022-06-02 NOTE — BRIEF OPERATIVE NOTE - NSICDXBRIEFPROCEDURE_GEN_ALL_CORE_FT
PROCEDURES:  Repair, back, complex, 5.1 cm to 7.5 cm 02-Jun-2022 17:14:13  [facility]  
PROCEDURES:  Sacrectomy for neoplasm 02-Jun-2022 16:17:05  Beni West  Sacrectomy with fusion of sacroiliac joint 02-Jun-2022 16:17:19  Beni West

## 2022-06-02 NOTE — PROGRESS NOTE ADULT - ASSESSMENT
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 She has no neurologic deficits, including no numbness, weakness, bowel/bladder/sexual dysfunction. (02 Jun 2022 07:03)  Day 0 post nerve sparing mid-sacrectomy for En Bloc chordoma resection, sacropelvic fusion, plastics closure    PLAN:  d/c IVF   x2 hemovac; monitor drain output  c/w pain management w/ methocarbamol, pregabalin,   home Seroquel   keep hannah in   CT pelvis tomorrow   post op abx   Feeding: [x] diet   Analgesia/Sedation: dilaudid PCA pump    Seizure prophylaxis: [x] not indicated  Thromboprophylaxis: [x] SCDs [] chemoprophylaxis [x] hold chemoprophylaxis due to: fresh post op  Head of Bed/Activity: [] 30 degrees [] mobilize as tolerated [x] PT [x] OT [] PMNR  Ulcer prophylaxis: [] not indicated [x] PPI   Glucose Control: Goal -180

## 2022-06-02 NOTE — H&P ADULT - ASSESSMENT
31F found to have a biopsy proven Chordoma incidentally after sustaining a fall in late 2020. She is here today for elective En Bloc resection    Plan:  - OR today for sacral tumor resection and possible posterior spinal instrumented fusion with plastic surgery closure  - Villaseñor catheter post-op  - Surgical drains per Plastic surgery  - Pain management post-op

## 2022-06-02 NOTE — H&P ADULT - HISTORY OF PRESENT ILLNESS
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 She has no neurologic deficits, including no numbness, weakness, bowel/bladder/sexual dysfunction.

## 2022-06-02 NOTE — CHART NOTE - NSCHARTNOTEFT_GEN_A_CORE
CAPRINI SCORE [CLOT]    AGE RELATED RISK FACTORS                                                       MOBILITY RELATED FACTORS  [ ] Age 41-60 years                                            (1 Point)                  [ ] Bed rest                                                        (1 Point)  [ ] Age: 61-74 years                                           (2 Points)                 [ ] Plaster cast                                                   (2 Points)  [ ] Age= 75 years                                              (3 Points)                 [ ] Bed bound for more than 72 hours                 (2 Points)    DISEASE RELATED RISK FACTORS                                               GENDER SPECIFIC FACTORS  [ ] Edema in the lower extremities                       (1 Point)                  [ ] Pregnancy                                                     (1 Point)  [ ] Varicose veins                                               (1 Point)                  [ ] Post-partum < 6 weeks                                   (1 Point)             [ x] BMI > 25 Kg/m2                                            (1 Point)                  [ ] Hormonal therapy  or oral contraception          (1 Point)                 [ ] Sepsis (in the previous month)                        (1 Point)                  [ ] History of pregnancy complications                 (1 point)  [ ] Pneumonia or serious lung disease                                               [ ] Unexplained or recurrent                     (1 Point)           (in the previous month)                               (1 Point)  [ ] Abnormal pulmonary function test                     (1 Point)                 SURGERY RELATED RISK FACTORS  [ ] Acute myocardial infarction                              (1 Point)                 [ ]  Section                                             (1 Point)  [ ] Congestive heart failure (in the previous month)  (1 Point)               [ ] Minor surgery                                                  (1 Point)   [ ] Inflammatory bowel disease                             (1 Point)                 [ ] Arthroscopic surgery                                        (2 Points)  [ ] Central venous access                                      (2 Points)                [ x] General surgery lasting more than 45 minutes   (2 Points)       [ ] Stroke (in the previous month)                          (5 Points)               [ ] Elective arthroplasty                                         (5 Points)                                                                                                                                               HEMATOLOGY RELATED FACTORS                                                 TRAUMA RELATED RISK FACTORS  [ ] Prior episodes of VTE                                     (3 Points)                [ ] Fracture of the hip, pelvis, or leg                       (5 Points)  [ ] Positive family history for VTE                         (3 Points)                 [ ] Acute spinal cord injury (in the previous month)  (5 Points)  [ ] Prothrombin 83903 A                                     (3 Points)                 [ ] Paralysis  (less than 1 month)                             (5 Points)  [ ] Factor V Leiden                                             (3 Points)                  [ ] Multiple Trauma within 1 month                        (5 Points)  [ ] Lupus anticoagulants                                     (3 Points)                                                           [ ] Anticardiolipin antibodies                               (3 Points)                                                       [ ] High homocysteine in the blood                      (3 Points)                                             [x ] Other congenital or acquired thrombophilia      (3 Points)                                                [ ] Heparin induced thrombocytopenia                  (3 Points)                                          Total Score [     6     ]    Caprini Score 0 - 2:  Low Risk, No VTE Prophylaxis required for most patients, encourage ambulation  Caprini Score 3 - 6:  At Risk, pharmacologic VTE prophylaxis is indicated for most patients (in the absence of a contraindication)  Caprini Score Greater than or = 7:  High Risk, pharmacologic VTE prophylaxis is indicated for most patients (in the absence of a contraindication)

## 2022-06-02 NOTE — BRIEF OPERATIVE NOTE - OPERATION/FINDINGS
Complex layered repair of back with paraspinal muscle flaps. Alloderm placed over rectum, nerve roots intact superficial to alloderm
S/p nerve sparing mid-sacrectomy for En Bloc chordoma resection, sacropelvic fusion, plastics closure

## 2022-06-02 NOTE — DISCHARGE NOTE NURSING/CASE MANAGEMENT/SOCIAL WORK - PATIENT PORTAL LINK FT
You can access the FollowMyHealth Patient Portal offered by [facility] by registering at the following website: [email or web address]. By joining [facility]’s FollowMyHealth portal, you will also be able to view your health information using other applications (apps) compatible with our system.

## 2022-06-02 NOTE — PRE-ANESTHESIA EVALUATION ADULT - NSANTHPMHFT_GEN_ALL_CORE
31 year old female with hx of herniated lumbar disc work up incidental finding mass  pelvic bones sacrum and coccyx.

## 2022-06-02 NOTE — PATIENT PROFILE ADULT - FALL HARM RISK - UNIVERSAL INTERVENTIONS
Bed in lowest position, wheels locked, appropriate side rails in place/Call bell, personal items and telephone in reach/Instruct patient to call for assistance before getting out of bed or chair/Non-slip footwear when patient is out of bed/[place] to call system/Physically safe environment - no spills, clutter or unnecessary equipment/Purposeful Proactive Rounding/Room/bathroom lighting operational, light cord in reach

## 2022-06-02 NOTE — BRIEF OPERATIVE NOTE - NSICDXBRIEFPREOP_GEN_ALL_CORE_FT
PRE-OP DIAGNOSIS:  Malignant neoplasm of sacral vertebra 02-Jun-2022 16:18:31  Beni West  
PRE-OP DIAGNOSIS:  Malignant neoplasm of sacral vertebra 02-Jun-2022 16:18:31  Beni West

## 2022-06-03 LAB
ANION GAP SERPL CALC-SCNC: 10 MMOL/L — SIGNIFICANT CHANGE UP (ref 5–17)
BUN SERPL-MCNC: 5 MG/DL — LOW (ref 7–23)
CALCIUM SERPL-MCNC: 7.9 MG/DL — LOW (ref 8.4–10.5)
CHLORIDE SERPL-SCNC: 100 MMOL/L — SIGNIFICANT CHANGE UP (ref 96–108)
CO2 SERPL-SCNC: 23 MMOL/L — SIGNIFICANT CHANGE UP (ref 22–31)
CREAT SERPL-MCNC: 0.58 MG/DL — SIGNIFICANT CHANGE UP (ref 0.5–1.3)
EGFR: 125 ML/MIN/1.73M2 — SIGNIFICANT CHANGE UP
GLUCOSE SERPL-MCNC: 97 MG/DL — SIGNIFICANT CHANGE UP (ref 70–99)
HCT VFR BLD CALC: 27.1 % — LOW (ref 34.5–45)
HGB BLD-MCNC: 8.8 G/DL — LOW (ref 11.5–15.5)
MAGNESIUM SERPL-MCNC: 1.9 MG/DL — SIGNIFICANT CHANGE UP (ref 1.6–2.6)
MAGNESIUM SERPL-MCNC: 2.1 MG/DL — SIGNIFICANT CHANGE UP (ref 1.6–2.6)
MCHC RBC-ENTMCNC: 30.2 PG — SIGNIFICANT CHANGE UP (ref 27–34)
MCHC RBC-ENTMCNC: 32.5 GM/DL — SIGNIFICANT CHANGE UP (ref 32–36)
MCV RBC AUTO: 93.1 FL — SIGNIFICANT CHANGE UP (ref 80–100)
NRBC # BLD: 0 /100 WBCS — SIGNIFICANT CHANGE UP (ref 0–0)
PHOSPHATE SERPL-MCNC: 1.9 MG/DL — LOW (ref 2.5–4.5)
PHOSPHATE SERPL-MCNC: 2.6 MG/DL — SIGNIFICANT CHANGE UP (ref 2.5–4.5)
PLATELET # BLD AUTO: 294 K/UL — SIGNIFICANT CHANGE UP (ref 150–400)
POTASSIUM SERPL-MCNC: 3.8 MMOL/L — SIGNIFICANT CHANGE UP (ref 3.5–5.3)
POTASSIUM SERPL-SCNC: 3.8 MMOL/L — SIGNIFICANT CHANGE UP (ref 3.5–5.3)
RBC # BLD: 2.91 M/UL — LOW (ref 3.8–5.2)
RBC # FLD: 12.5 % — SIGNIFICANT CHANGE UP (ref 10.3–14.5)
SODIUM SERPL-SCNC: 133 MMOL/L — LOW (ref 135–145)
WBC # BLD: 10.82 K/UL — HIGH (ref 3.8–10.5)
WBC # FLD AUTO: 10.82 K/UL — HIGH (ref 3.8–10.5)

## 2022-06-03 PROCEDURE — 93970 EXTREMITY STUDY: CPT | Mod: 26

## 2022-06-03 PROCEDURE — 76377 3D RENDER W/INTRP POSTPROCES: CPT | Mod: 26

## 2022-06-03 PROCEDURE — 99232 SBSQ HOSP IP/OBS MODERATE 35: CPT

## 2022-06-03 PROCEDURE — 72192 CT PELVIS W/O DYE: CPT | Mod: 26

## 2022-06-03 RX ORDER — CHLORHEXIDINE GLUCONATE 213 G/1000ML
1 SOLUTION TOPICAL
Refills: 0 | Status: DISCONTINUED | OUTPATIENT
Start: 2022-06-03 | End: 2022-06-03

## 2022-06-03 RX ORDER — POTASSIUM PHOSPHATE, MONOBASIC POTASSIUM PHOSPHATE, DIBASIC 236; 224 MG/ML; MG/ML
30 INJECTION, SOLUTION INTRAVENOUS ONCE
Refills: 0 | Status: COMPLETED | OUTPATIENT
Start: 2022-06-03 | End: 2022-06-04

## 2022-06-03 RX ORDER — ENOXAPARIN SODIUM 100 MG/ML
40 INJECTION SUBCUTANEOUS
Refills: 0 | Status: DISCONTINUED | OUTPATIENT
Start: 2022-06-03 | End: 2022-06-03

## 2022-06-03 RX ORDER — ACETAMINOPHEN 500 MG
1000 TABLET ORAL ONCE
Refills: 0 | Status: COMPLETED | OUTPATIENT
Start: 2022-06-03 | End: 2022-06-03

## 2022-06-03 RX ORDER — ACETAMINOPHEN 500 MG
1000 TABLET ORAL EVERY 8 HOURS
Refills: 0 | Status: DISCONTINUED | OUTPATIENT
Start: 2022-06-03 | End: 2022-06-07

## 2022-06-03 RX ORDER — SODIUM CHLORIDE 9 MG/ML
500 INJECTION INTRAMUSCULAR; INTRAVENOUS; SUBCUTANEOUS ONCE
Refills: 0 | Status: COMPLETED | OUTPATIENT
Start: 2022-06-03 | End: 2022-06-03

## 2022-06-03 RX ORDER — ENOXAPARIN SODIUM 100 MG/ML
40 INJECTION SUBCUTANEOUS
Refills: 0 | Status: DISCONTINUED | OUTPATIENT
Start: 2022-06-03 | End: 2022-06-09

## 2022-06-03 RX ADMIN — HYDROMORPHONE HYDROCHLORIDE 30 MILLILITER(S): 2 INJECTION INTRAMUSCULAR; INTRAVENOUS; SUBCUTANEOUS at 22:27

## 2022-06-03 RX ADMIN — Medication 1000 MILLIGRAM(S): at 06:06

## 2022-06-03 RX ADMIN — ENOXAPARIN SODIUM 40 MILLIGRAM(S): 100 INJECTION SUBCUTANEOUS at 21:30

## 2022-06-03 RX ADMIN — METHOCARBAMOL 500 MILLIGRAM(S): 500 TABLET, FILM COATED ORAL at 21:30

## 2022-06-03 RX ADMIN — ONDANSETRON 4 MILLIGRAM(S): 8 TABLET, FILM COATED ORAL at 13:04

## 2022-06-03 RX ADMIN — Medication 50 MILLIGRAM(S): at 06:06

## 2022-06-03 RX ADMIN — SODIUM CHLORIDE 500 MILLILITER(S): 9 INJECTION INTRAMUSCULAR; INTRAVENOUS; SUBCUTANEOUS at 15:30

## 2022-06-03 RX ADMIN — Medication 400 MILLIGRAM(S): at 19:35

## 2022-06-03 RX ADMIN — HYDROMORPHONE HYDROCHLORIDE 30 MILLILITER(S): 2 INJECTION INTRAMUSCULAR; INTRAVENOUS; SUBCUTANEOUS at 19:01

## 2022-06-03 RX ADMIN — METHOCARBAMOL 500 MILLIGRAM(S): 500 TABLET, FILM COATED ORAL at 06:06

## 2022-06-03 RX ADMIN — HYDROMORPHONE HYDROCHLORIDE 0.5 MILLIGRAM(S): 2 INJECTION INTRAMUSCULAR; INTRAVENOUS; SUBCUTANEOUS at 12:20

## 2022-06-03 RX ADMIN — Medication 1000 MILLIGRAM(S): at 06:07

## 2022-06-03 RX ADMIN — Medication 50 MILLIGRAM(S): at 21:30

## 2022-06-03 RX ADMIN — HYDROMORPHONE HYDROCHLORIDE 30 MILLILITER(S): 2 INJECTION INTRAMUSCULAR; INTRAVENOUS; SUBCUTANEOUS at 07:14

## 2022-06-03 RX ADMIN — ONDANSETRON 4 MILLIGRAM(S): 8 TABLET, FILM COATED ORAL at 06:06

## 2022-06-03 RX ADMIN — Medication 1000 MILLIGRAM(S): at 20:05

## 2022-06-03 RX ADMIN — Medication 100 MILLIGRAM(S): at 07:44

## 2022-06-03 RX ADMIN — Medication 50 MILLIGRAM(S): at 13:05

## 2022-06-03 RX ADMIN — METHOCARBAMOL 500 MILLIGRAM(S): 500 TABLET, FILM COATED ORAL at 13:05

## 2022-06-03 RX ADMIN — SODIUM CHLORIDE 500 MILLILITER(S): 9 INJECTION INTRAMUSCULAR; INTRAVENOUS; SUBCUTANEOUS at 12:50

## 2022-06-03 RX ADMIN — PANTOPRAZOLE SODIUM 40 MILLIGRAM(S): 20 TABLET, DELAYED RELEASE ORAL at 06:09

## 2022-06-03 RX ADMIN — ONDANSETRON 4 MILLIGRAM(S): 8 TABLET, FILM COATED ORAL at 17:50

## 2022-06-03 RX ADMIN — QUETIAPINE FUMARATE 50 MILLIGRAM(S): 200 TABLET, FILM COATED ORAL at 21:30

## 2022-06-03 NOTE — PROGRESS NOTE ADULT - ASSESSMENT
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 She has no neurologic deficits, including no numbness, weakness, bowel/bladder/sexual dysfunction. (02 Jun 2022 07:03)  Day 0 post nerve sparing mid-sacrectomy for En Bloc chordoma resection, sacropelvic fusion, plastics closure    PLAN:  x2 hemovac; monitor drain output  c/w pain management w/ methocarbamol, pregabalin,   home Seroquel   keep hannah in   Feeding: [x] diet   Analgesia/Sedation: dilaudid PCA pump    Seizure prophylaxis: [x] not indicated  Thromboprophylaxis: [x] SCDs [] chemoprophylaxis [x] hold chemoprophylaxis due to: lovenox  Head of Bed/Activity: [] 30 degrees [] mobilize as tolerated [x] PT [x] OT [] PMNR  Ulcer prophylaxis: [] not indicated [x] PPI   Glucose Control: Goal -180

## 2022-06-03 NOTE — PROGRESS NOTE ADULT - SUBJECTIVE AND OBJECTIVE BOX
Patient seen and examined at bedside.    --Anticoagulation--    T(C): 36.6 (06-03-22 @ 03:00), Max: 37.1 (06-02-22 @ 17:00)  HR: 86 (06-03-22 @ 05:00) (80 - 122)  BP: 119/88 (06-02-22 @ 07:19) (119/88 - 119/88)  RR: 13 (06-03-22 @ 05:00) (12 - 21)  SpO2: 97% (06-03-22 @ 05:00) (92% - 100%)  Wt(kg): --    Exam:Intact w/ S1 sensory deficit    Labs:                        9.2    13.58 )-----------( 319      ( 02 Jun 2022 22:27 )             27.5     06-02    137  |  103  |  6<L>  ----------------------------<  130<H>  4.3   |  21<L>  |  0.65    Ca    8.6      02 Jun 2022 22:27  Phos  2.6     06-02  Mg     2.1     06-02

## 2022-06-03 NOTE — PROGRESS NOTE ADULT - SUBJECTIVE AND OBJECTIVE BOX
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 She has no neurologic deficits, including no numbness, weakness, bowel/bladder/sexual dysfunction. (02 Jun 2022 07:03)    Day 1 post nerve sparing mid-sacrectomy for En Bloc chordoma resection, sacropelvic fusion, plastics closure    OVERNIGHT EVENTS:   No acute events overnight.    VITALS:  T(C): , Max: 37.1 (06-02-22 @ 17:00)  HR:  (80 - 122)  BP:  (119/88 - 119/88)  ABP:  (94/59 - 135/86)  RR:  (12 - 21)  SpO2:  (92% - 100%)  Wt(kg): --      06-02-22 @ 07:01  -  06-03-22 @ 07:00  --------------------------------------------------------  IN: 1065 mL / OUT: 1915 mL / NET: -850 mL      LABS:  Na: 137 (06-02 @ 22:27)  K: 4.3 (06-02 @ 22:27)  Cl: 103 (06-02 @ 22:27)  CO2: 21 (06-02 @ 22:27)  BUN: 6 (06-02 @ 22:27)  Cr: 0.65 (06-02 @ 22:27)  Glu: 130(06-02 @ 22:27)    Hgb: 9.2 (06-02 @ 22:27)  Hct: 27.5 (06-02 @ 22:27)  WBC: 13.58 (06-02 @ 22:27)  Plt: 319 (06-02 @ 22:27)    INR:   PTT:     IMAGING:   Recent imaging studies were reviewed.    MEDICATIONS:  acetaminophen     Tablet .. 1000 milliGRAM(s) Oral every 8 hours  aluminum hydroxide/magnesium hydroxide/simethicone Suspension 30 milliLiter(s) Oral every 4 hours PRN  butorphanol Injectable 0.25 milliGRAM(s) IV Push every 6 hours PRN  ceFAZolin   IVPB 2000 milliGRAM(s) IV Intermittent once  ceFAZolin   IVPB 2000 milliGRAM(s) IV Intermittent every 8 hours  diphenhydrAMINE Injectable 25 milliGRAM(s) IV Push every 4 hours PRN  HYDROmorphone PCA (1 mG/mL) 30 milliLiter(s) PCA Continuous PCA Continuous  HYDROmorphone PCA (1 mG/mL) Rescue Clinician Bolus 0.5 milliGRAM(s) IV Push every 15 minutes PRN  methocarbamol 500 milliGRAM(s) Oral every 8 hours  naloxone Injectable 0.1 milliGRAM(s) IV Push every 3 minutes PRN  ondansetron   Disintegrating Tablet 4 milliGRAM(s) Oral every 6 hours  ondansetron Injectable 4 milliGRAM(s) IV Push every 6 hours PRN  pantoprazole    Tablet 40 milliGRAM(s) Oral before breakfast  pregabalin 50 milliGRAM(s) Oral three times a day  QUEtiapine 50 milliGRAM(s) Oral at bedtime    EXAMINATION:  General:  calm  HEENT:  MMM  Neuro:  awake, alert, oriented x 3, follows commands, moves all extremities, no sensory deficits   Cards:  RRR  Respiratory:  no respiratory distress  Adomen:  soft  Extremities:  no edema  Skin:  warm/dry

## 2022-06-03 NOTE — OCCUPATIONAL THERAPY INITIAL EVALUATION ADULT - BALANCE TRAINING, PT EVAL
GOAL: Pt will increase dynamic standing balance by 1/2 grade to facilitate ability to perform ADLs and functional mobility within 4 weeks .

## 2022-06-03 NOTE — PROGRESS NOTE ADULT - ASSESSMENT
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 She has no neurologic deficits, including no numbness, weakness, bowel/bladder/sexual dysfunction. (02 Jun 2022 07:03)  Day 0 post nerve sparing mid-sacrectomy for En Bloc chordoma resection, sacropelvic fusion, plastics closure    NEURO:    q1h neuro checks  CT Lumbosacral spine  Pain management  Pregabalin 50 mg TID  Care for the drains   Quetiapine 50 mg OD    CARDS:    -160    PULM:   RA    RENAL:    NS at 75 ml/hr     GASTRO:    advance as tolerated  Stress ulcer prophylaxis:  Protonix 40 mg OD    HEME:    monitor H/H    DVT prophylaxis: SCDs, hold anticoagulation, fresh post op    ENDO:    euglycemia    ID:    Monitor for fever  Perioperative ANCEF     Code status:  Full code  Disposition:  ICU   Summary:   31F who was found to have a sacral lesion after sustaining a fall that has 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 She has no neurologic deficits, including no numbness, weakness, bowel/bladder/sexual dysfunction. (02 Jun 2022 07:03)  Day 0 post nerve sparing mid-sacrectomy for En Bloc chordoma resection, sacropelvic fusion, plastics closure    NEURO:    q4h neuro checks  CT Lumbosacral spine  Pain management  Pregabalin 50 mg TID  Care for the drains   Quetiapine 50 mg OD    CARDS:    -160    PULM:   RA    RENAL:    IVL  Keep Villaseñor cath     GASTRO:    Regular diet  Stress ulcer prophylaxis:  Protonix 40 mg OD    HEME:    monitor H/H    DVT prophylaxis: SCDs, Consider starting Lovenox     ENDO:    euglycemia    ID:    Monitor for fever    Code status:  Full code  Disposition:  ICU

## 2022-06-03 NOTE — PROGRESS NOTE ADULT - SUBJECTIVE AND OBJECTIVE BOX
NEUROCRITICAL CARE  EVENING NOTE    BRIEF SUMMARY:  31yFemale presented with Patient is a 31y old  Female who presents with a chief complaint of Sacral chordoma (02 Jun 2022 17:06)        VITALS/IMAGING/DATA/IVF FLUIDS/MEDICATIONS: [x] Reviewed    ALLERGIES: Allergies  catscan dye (Swelling)  IV Contrast (Hives; Rash)  Intolerances        EXAMINATION:  General: No acute distress  HEENT: Anicteric sclerae  Cardiac: [id number]  Lungs: Clear  Abdomen: Soft, non-tender, +BS  Extremities: No c/c/e  Skin/Incision Site: Clean, dry and intact  Neurologic: Awake, alert, fully oriented, follows commands, PERRL, VFFtc, EOMI, face symmetric, tongue midline, no drift, full strength,

## 2022-06-03 NOTE — PROGRESS NOTE ADULT - ATTENDING COMMENTS
32 yo woman with an incidentally found sacral chordoma now s/p nerve sparing mid-sacrectomy for En Bloc chordoma resection, sacropelvic fusion, plastics closure on 6/2.     Today patient denies any LE weakness or numbness. Reports good pain control with PCA.    On exam, HF, KE, KF, DF and PF 5/5, no numbness in LEs.     q2h turning   PCA for pain control   Seroquel home med  keep hannah, no bowel regimen  no Lov ppx     Patient is not critically ill but is medically complex due to mid-sacrectomy for sacral chordoma resection at risk potential post-op complications such as hemorrhage, requiring monitoring, requiring pain control.
Patient seen and examined by attending on 6/2/2022.    32 yo woman with an incidentally found sacral chordoma now s/p nerve sparing mid-sacrectomy for En Bloc chordoma resection, sacropelvic fusion, plastics closure.    Post op denies any changes in LEs    On exam, HF, KE, KF, DF and PF 5/5, no numbness in LEs.     CT Lumbosacral spine in Am  q2h turning   PCA for pain control   no Lov ppx     Patient is critically ill due to mid-sacrectomy for sacral chordoma resection and at high risk for neurological deterioration or death due to: potential post-op complications such as hemorrhage, requiring close monitoring.

## 2022-06-03 NOTE — PHYSICAL THERAPY INITIAL EVALUATION ADULT - GENERAL OBSERVATIONS, REHAB EVAL
Chart reviewed events to date noted. Blood glucose reviewed. Pt tolerated 45min PT initial evaluation well. Pt POD1 s/p sacrectomy for sarcoma resection. Pt rec'd in bed in NAD, +hVAC x2, ICU monitoirng, PCA pump (utilized before mobility).

## 2022-06-03 NOTE — PHYSICAL THERAPY INITIAL EVALUATION ADULT - FOLLOWS COMMANDS/ANSWERS QUESTIONS, REHAB EVAL
Peripheral Nerve Block Procedure Note    Staff:     Anesthesiologist:  Pradeep Bennett MD    Resident/CRNA:  Cesar Barrett MD    Block performed by resident/CRNA in the presence of a teaching physician    Location: Pre-op  Procedure Start/Stop TImes:      5/29/2019 11:26 AM     5/29/2019 11:32 AM    patient identified, IV checked, site marked, risks and benefits discussed, informed consent, monitors and equipment checked, pre-op evaluation, at physician/surgeon's request and post-op pain management      Correct Patient: Yes      Correct Position: Yes      Correct Site: Yes      Correct Procedure: Yes      Correct Laterality:  Yes    Site Marked:  Yes  Procedure details:     Procedure:  Other (iPack block)    ASA:  2    Diagnosis:  Perioperative Pain    Laterality:  Right    Position:  Supine    Sterile Prep: chloraprep, mask and sterile gloves      Local skin infiltration:  None    Needle:  Short bevel    Needle gauge:  21    Needle length (mm):  110    Ultrasound: Yes      Ultrasound used to identify targeted nerve, plexus, or vascular structure and placed a needle adjacent to it      Permanent Image entered into patiient's record      Abnormal pain on injection: No      Blood Aspirated: No      Paresthesias:  No    Bleeding at site: No      Bolus via:  Needle    Infusion Method:  Single Shot    Complications:  None  Assessment/Narrative:     Injection made incrementally with aspirations every (mL):  5     133 mg of exparal administered for the iPack block    Discussed with Patient Off-Label use of Liposomal Bupivacaine (Exparel) for Nerve Block.    Relevant risks & benefits were discussed with patient.    All questions were answered and there was agreement to proceed.    Patient signed Off-Label Use of Exparel Consent Form.             100% of the time

## 2022-06-03 NOTE — PROGRESS NOTE ADULT - ASSESSMENT
31yoF s/p mid sacrectomy for incidentally found chordoma w/ paraspinal muscle flap closure on 6/2.  Doing well    - Continue to Monitor JPs  - Dressing to remain in place.   - Continue to turn q2 hours.    Daniele Haro  Plastic Surgery Resident  [facility]: [phone]  [facility]: [id number]

## 2022-06-03 NOTE — PHYSICAL THERAPY INITIAL EVALUATION ADULT - LIVES WITH, PROFILE
Pt lives in [place] with her fiance however will d/c home to her mother's house in [place] who has 3 steps to enter, +first floor set-up, +shower tub. PTA IND with ADL/mobility +driving/significant other

## 2022-06-03 NOTE — OCCUPATIONAL THERAPY INITIAL EVALUATION ADULT - PERTINENT HX OF CURRENT PROBLEM, REHAB EVAL
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 Now s/p nerve sparing mid-sacrectomy for En Bloc chordoma resection, sacropelvic fusion, plastics closure 6/2/22.

## 2022-06-03 NOTE — OCCUPATIONAL THERAPY INITIAL EVALUATION ADULT - LIVES WITH, PROFILE
Pt lives in [place] with her fiance however will d/c home to her mother's house in [place] who has 3 steps to enter, +first floor set-up, +shower tub. PTA IND with ADL/mobility +driving

## 2022-06-03 NOTE — PROGRESS NOTE ADULT - ASSESSMENT
31F s/p mid-sacrectomy for incidental chordoma found after a fall, doing well postop.   - ct bony pelvis in AM, if stable then floor  - fu drain outputs  - if CT stable dvt ppx  - PCA  - PT pending  - q2 rotation

## 2022-06-03 NOTE — PROGRESS NOTE ADULT - SUBJECTIVE AND OBJECTIVE BOX
Plastic Surgery Progress Note    Subjective  - Pt seen and examined on AM rounds  - Pt reports good pain control  - Denies n/v    Objective  Physical Exam  GEN: NAD, comfortable  BACK:  Soft, flat , no evidence of fluid collection or infection.  Dressing in place c/d/i.  JASSON holding suction with SS output      06-02-22 @ 07:01  -  06-03-22 @ 07:00  --------------------------------------------------------  IN: 1065 mL / OUT: 1915 mL / NET: -850 mL

## 2022-06-03 NOTE — OCCUPATIONAL THERAPY INITIAL EVALUATION ADULT - PRECAUTIONS/LIMITATIONS, REHAB EVAL
rotate L/R every 30 mins to offload incision. No sitting more than 30 minutes./fall precautions/spinal precautions

## 2022-06-03 NOTE — PROGRESS NOTE ADULT - SUBJECTIVE AND OBJECTIVE BOX
Day 1 of Anesthesia Pain Management Service    SUBJECTIVE: Patient is doing well with IV PCA    Pain Scale Score:	[X] Refer to charted pain scores    THERAPY:    [ ] IV PCA Morphine		[ ] 5 mg/mL	[ ] 1 mg/mL  [X] IV PCA Hydromorphone	[ ] 5 mg/mL	[X] 1 mg/mL  [ ] IV PCA Fentanyl		[ ] 50 micrograms/mL    Demand dose: 0.2 mg     Lockout: 6 minutes   Continuous Rate: 0 mg/hr  4 Hour Limit: 4 mg    MEDICATIONS  (STANDING):  acetaminophen     Tablet .. 1000 milliGRAM(s) Oral every 8 hours  ceFAZolin   IVPB 2000 milliGRAM(s) IV Intermittent once  HYDROmorphone PCA (1 mG/mL) 30 milliLiter(s) PCA Continuous PCA Continuous  methocarbamol 500 milliGRAM(s) Oral every 8 hours  ondansetron   Disintegrating Tablet 4 milliGRAM(s) Oral every 6 hours  pantoprazole    Tablet 40 milliGRAM(s) Oral before breakfast  pregabalin 50 milliGRAM(s) Oral three times a day  QUEtiapine 50 milliGRAM(s) Oral at bedtime    MEDICATIONS  (PRN):  aluminum hydroxide/magnesium hydroxide/simethicone Suspension 30 milliLiter(s) Oral every 4 hours PRN Dyspepsia  butorphanol Injectable 0.25 milliGRAM(s) IV Push every 6 hours PRN Pruritus  diphenhydrAMINE Injectable 25 milliGRAM(s) IV Push every 4 hours PRN Pruritus  HYDROmorphone PCA (1 mG/mL) Rescue Clinician Bolus 0.5 milliGRAM(s) IV Push every 15 minutes PRN for Pain Scale GREATER THAN 6  naloxone Injectable 0.1 milliGRAM(s) IV Push every 3 minutes PRN For ANY of the following changes in patient status:  A. RR LESS THAN 10 breaths per minute, B. Oxygen saturation LESS THAN 90%, C. Sedation score of 6  ondansetron Injectable 4 milliGRAM(s) IV Push every 6 hours PRN Nausea      OBJECTIVE:    Sedation Score:	[ X] Alert	[ ] Drowsy 	[ ] Arousable	[ ] Asleep	[ ] Unresponsive    Side Effects:	[X ] None	[ ] Nausea	[ ] Vomiting	[ ] Pruritus  		[ ] Other:    Vital Signs Last 24 Hrs  T(C): 36.6 (03 Jun 2022 07:00), Max: 37.1 (02 Jun 2022 17:00)  T(F): 97.9 (03 Jun 2022 07:00), Max: 98.8 (02 Jun 2022 17:00)  HR: 81 (03 Jun 2022 08:00) (80 - 122)  BP: --  BP(mean): --  RR: 23 (03 Jun 2022 08:00) (12 - 24)  SpO2: 98% (03 Jun 2022 08:00) (92% - 100%)    ASSESSMENT/ PLAN    Therapy to  be:               [X] Continued   [ ] Discontinued   [ ] Changed to PRN Analgesics    Documentation and Verification of current medications:   [X] Done	[ ] Not done, not eligible    Comments:

## 2022-06-04 LAB
ANION GAP SERPL CALC-SCNC: 10 MMOL/L — SIGNIFICANT CHANGE UP (ref 5–17)
BUN SERPL-MCNC: <4 MG/DL — LOW (ref 7–23)
CALCIUM SERPL-MCNC: 8.5 MG/DL — SIGNIFICANT CHANGE UP (ref 8.4–10.5)
CHLORIDE SERPL-SCNC: 100 MMOL/L — SIGNIFICANT CHANGE UP (ref 96–108)
CO2 SERPL-SCNC: 26 MMOL/L — SIGNIFICANT CHANGE UP (ref 22–31)
CREAT SERPL-MCNC: 0.56 MG/DL — SIGNIFICANT CHANGE UP (ref 0.5–1.3)
EGFR: 126 ML/MIN/1.73M2 — SIGNIFICANT CHANGE UP
GLUCOSE SERPL-MCNC: 115 MG/DL — HIGH (ref 70–99)
HCT VFR BLD CALC: 27.2 % — LOW (ref 34.5–45)
HGB BLD-MCNC: 9.1 G/DL — LOW (ref 11.5–15.5)
MAGNESIUM SERPL-MCNC: 2 MG/DL — SIGNIFICANT CHANGE UP (ref 1.6–2.6)
MCHC RBC-ENTMCNC: 30.4 PG — SIGNIFICANT CHANGE UP (ref 27–34)
MCHC RBC-ENTMCNC: 33.5 GM/DL — SIGNIFICANT CHANGE UP (ref 32–36)
MCV RBC AUTO: 91 FL — SIGNIFICANT CHANGE UP (ref 80–100)
NRBC # BLD: 0 /100 WBCS — SIGNIFICANT CHANGE UP (ref 0–0)
PHOSPHATE SERPL-MCNC: 3.8 MG/DL — SIGNIFICANT CHANGE UP (ref 2.5–4.5)
PLATELET # BLD AUTO: 314 K/UL — SIGNIFICANT CHANGE UP (ref 150–400)
POTASSIUM SERPL-MCNC: 4.3 MMOL/L — SIGNIFICANT CHANGE UP (ref 3.5–5.3)
POTASSIUM SERPL-SCNC: 4.3 MMOL/L — SIGNIFICANT CHANGE UP (ref 3.5–5.3)
RBC # BLD: 2.99 M/UL — LOW (ref 3.8–5.2)
RBC # FLD: 12.3 % — SIGNIFICANT CHANGE UP (ref 10.3–14.5)
SODIUM SERPL-SCNC: 136 MMOL/L — SIGNIFICANT CHANGE UP (ref 135–145)
WBC # BLD: 10.3 K/UL — SIGNIFICANT CHANGE UP (ref 3.8–10.5)
WBC # FLD AUTO: 10.3 K/UL — SIGNIFICANT CHANGE UP (ref 3.8–10.5)

## 2022-06-04 RX ADMIN — ONDANSETRON 4 MILLIGRAM(S): 8 TABLET, FILM COATED ORAL at 01:01

## 2022-06-04 RX ADMIN — Medication 50 MILLIGRAM(S): at 06:02

## 2022-06-04 RX ADMIN — QUETIAPINE FUMARATE 50 MILLIGRAM(S): 200 TABLET, FILM COATED ORAL at 21:45

## 2022-06-04 RX ADMIN — ONDANSETRON 4 MILLIGRAM(S): 8 TABLET, FILM COATED ORAL at 06:02

## 2022-06-04 RX ADMIN — HYDROMORPHONE HYDROCHLORIDE 0.5 MILLIGRAM(S): 2 INJECTION INTRAMUSCULAR; INTRAVENOUS; SUBCUTANEOUS at 15:14

## 2022-06-04 RX ADMIN — HYDROMORPHONE HYDROCHLORIDE 0.5 MILLIGRAM(S): 2 INJECTION INTRAMUSCULAR; INTRAVENOUS; SUBCUTANEOUS at 08:07

## 2022-06-04 RX ADMIN — POTASSIUM PHOSPHATE, MONOBASIC POTASSIUM PHOSPHATE, DIBASIC 83.33 MILLIMOLE(S): 236; 224 INJECTION, SOLUTION INTRAVENOUS at 01:01

## 2022-06-04 RX ADMIN — Medication 50 MILLIGRAM(S): at 21:45

## 2022-06-04 RX ADMIN — ONDANSETRON 4 MILLIGRAM(S): 8 TABLET, FILM COATED ORAL at 14:12

## 2022-06-04 RX ADMIN — METHOCARBAMOL 500 MILLIGRAM(S): 500 TABLET, FILM COATED ORAL at 14:12

## 2022-06-04 RX ADMIN — Medication 50 MILLIGRAM(S): at 14:12

## 2022-06-04 RX ADMIN — ONDANSETRON 4 MILLIGRAM(S): 8 TABLET, FILM COATED ORAL at 18:58

## 2022-06-04 RX ADMIN — PANTOPRAZOLE SODIUM 40 MILLIGRAM(S): 20 TABLET, DELAYED RELEASE ORAL at 06:03

## 2022-06-04 RX ADMIN — HYDROMORPHONE HYDROCHLORIDE 30 MILLILITER(S): 2 INJECTION INTRAMUSCULAR; INTRAVENOUS; SUBCUTANEOUS at 07:40

## 2022-06-04 RX ADMIN — ENOXAPARIN SODIUM 40 MILLIGRAM(S): 100 INJECTION SUBCUTANEOUS at 21:57

## 2022-06-04 RX ADMIN — METHOCARBAMOL 500 MILLIGRAM(S): 500 TABLET, FILM COATED ORAL at 21:45

## 2022-06-04 RX ADMIN — METHOCARBAMOL 500 MILLIGRAM(S): 500 TABLET, FILM COATED ORAL at 06:02

## 2022-06-04 NOTE — PROGRESS NOTE ADULT - ASSESSMENT
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 She has no neurologic deficits, including no numbness, weakness, bowel/bladder/sexual dysfunction. (02 Jun 2022 07:03)    PROCEDURE: Adm 6/2 Sacrectomy for neoplasm, Sacrectomy with fusion of sacroiliac joint, Repair, back, complex, 5.1 cm to 7.5 cm plastic closure     POD#2    PLAN:  Neuro: Cont HMV drain x2.  Cont PCA per pt request-will DC 6/5.  Keep hannah till Sun/Mon FU. Rotate Q2hr to off load pressure from incision. No sitting >30mins. Anemia trending upwards-cont to monitor. Leukocytosis and Hyponatremia resolved.  Inc activity/OOB.     Respiratory: Patient instructed to use incentive spirometer [ X] YES [ ] NO              DVT ppx: [X ] SQL [ ] SQH and Venodynes [ ] Left [ ] Right [ X] Bilateral    Discharge Planning:  The patient was evaluated by PT/OT and is progressing towards home with Outpt PT.    More than 30 minutes spent on total encounter: more than 50% of the visit was spent on educating the patient and family regarding condition, medications, follow up plans, signs and symptoms to be concerned with, preparing paperwork, and questions answered regarding discharge.

## 2022-06-04 NOTE — CHART NOTE - NSCHARTNOTEFT_GEN_A_CORE
Anesthesia Pain Management Service    SUBJECTIVE: Patient is doing well with IV PCA    OBJECTIVE:       Pain Scale Score:	[X] Refer to charted pain scores            MEDICATIONS  (STANDING):  enoxaparin Injectable 40 milliGRAM(s) SubCutaneous <User Schedule>  HYDROmorphone PCA (1 mG/mL) 30 milliLiter(s) PCA Continuous PCA Continuous  methocarbamol 500 milliGRAM(s) Oral every 8 hours  ondansetron   Disintegrating Tablet 4 milliGRAM(s) Oral every 6 hours  pantoprazole    Tablet 40 milliGRAM(s) Oral before breakfast  pregabalin 50 milliGRAM(s) Oral three times a day  QUEtiapine 50 milliGRAM(s) Oral at bedtime    MEDICATIONS  (PRN):  acetaminophen     Tablet .. 1000 milliGRAM(s) Oral every 8 hours PRN Temp greater or equal to 38C (100.4F), Mild Pain (1 - 3)  aluminum hydroxide/magnesium hydroxide/simethicone Suspension 30 milliLiter(s) Oral every 4 hours PRN Dyspepsia  butorphanol Injectable 0.25 milliGRAM(s) IV Push every 6 hours PRN Pruritus  diphenhydrAMINE Injectable 25 milliGRAM(s) IV Push every 4 hours PRN Pruritus  HYDROmorphone PCA (1 mG/mL) Rescue Clinician Bolus 0.5 milliGRAM(s) IV Push every 15 minutes PRN for Pain Scale GREATER THAN 6  naloxone Injectable 0.1 milliGRAM(s) IV Push every 3 minutes PRN For ANY of the following changes in patient status:  A. RR LESS THAN 10 breaths per minute, B. Oxygen saturation LESS THAN 90%, C. Sedation score of 6  ondansetron Injectable 4 milliGRAM(s) IV Push every 6 hours PRN Nausea          Sedation Score:	[ X] Alert	[ ] Drowsy 	[ ] Arousable	[ ] Asleep	[ ] Unresponsive        Side Effects:	[X ] None	[ ] Nausea	[ ] Vomiting	[ ] Pruritus  		[ ] Other:       Vital Signs Last 24 Hrs  T(C): 37.7 (04 Jun 2022 05:11), Max: 37.7 (04 Jun 2022 05:11)  T(F): 99.9 (04 Jun 2022 05:11), Max: 99.9 (04 Jun 2022 05:11)  HR: 117 (04 Jun 2022 05:11) (81 - 129)  BP: 103/69 (04 Jun 2022 05:11) (87/54 - 104/72)  BP(mean): 79 (03 Jun 2022 21:00) (66 - 96)  RR: 18 (04 Jun 2022 05:11) (10 - 23)  SpO2: 95% (04 Jun 2022 05:11) (95% - 100%)    ASSESSMENT/ PLAN    Therapy to  be:               [X] Continued   [ ] Discontinued   [ ] Changed to PRN Analgesics    Documentation and Verification of current medications:   [X] Done	[ ] Not done, not eligible

## 2022-06-05 RX ORDER — LOPERAMIDE HCL 2 MG
2 TABLET ORAL ONCE
Refills: 0 | Status: COMPLETED | OUTPATIENT
Start: 2022-06-05 | End: 2022-06-05

## 2022-06-05 RX ORDER — DEXTROSE MONOHYDRATE, SODIUM CHLORIDE, AND POTASSIUM CHLORIDE 50; .745; 4.5 G/1000ML; G/1000ML; G/1000ML
1000 INJECTION, SOLUTION INTRAVENOUS
Refills: 0 | Status: DISCONTINUED | OUTPATIENT
Start: 2022-06-05 | End: 2022-06-06

## 2022-06-05 RX ADMIN — ONDANSETRON 4 MILLIGRAM(S): 8 TABLET, FILM COATED ORAL at 06:00

## 2022-06-05 RX ADMIN — ENOXAPARIN SODIUM 40 MILLIGRAM(S): 100 INJECTION SUBCUTANEOUS at 23:20

## 2022-06-05 RX ADMIN — METHOCARBAMOL 500 MILLIGRAM(S): 500 TABLET, FILM COATED ORAL at 23:20

## 2022-06-05 RX ADMIN — DEXTROSE MONOHYDRATE, SODIUM CHLORIDE, AND POTASSIUM CHLORIDE 75 MILLILITER(S): 50; .745; 4.5 INJECTION, SOLUTION INTRAVENOUS at 12:00

## 2022-06-05 RX ADMIN — Medication 50 MILLIGRAM(S): at 06:00

## 2022-06-05 RX ADMIN — ONDANSETRON 4 MILLIGRAM(S): 8 TABLET, FILM COATED ORAL at 13:19

## 2022-06-05 RX ADMIN — HYDROMORPHONE HYDROCHLORIDE 30 MILLILITER(S): 2 INJECTION INTRAMUSCULAR; INTRAVENOUS; SUBCUTANEOUS at 08:38

## 2022-06-05 RX ADMIN — Medication 50 MILLIGRAM(S): at 23:19

## 2022-06-05 RX ADMIN — Medication 50 MILLIGRAM(S): at 13:23

## 2022-06-05 RX ADMIN — Medication 2 MILLIGRAM(S): at 05:59

## 2022-06-05 RX ADMIN — PANTOPRAZOLE SODIUM 40 MILLIGRAM(S): 20 TABLET, DELAYED RELEASE ORAL at 06:00

## 2022-06-05 RX ADMIN — METHOCARBAMOL 500 MILLIGRAM(S): 500 TABLET, FILM COATED ORAL at 05:59

## 2022-06-05 RX ADMIN — ONDANSETRON 4 MILLIGRAM(S): 8 TABLET, FILM COATED ORAL at 00:52

## 2022-06-05 RX ADMIN — HYDROMORPHONE HYDROCHLORIDE 30 MILLILITER(S): 2 INJECTION INTRAMUSCULAR; INTRAVENOUS; SUBCUTANEOUS at 04:26

## 2022-06-05 RX ADMIN — METHOCARBAMOL 500 MILLIGRAM(S): 500 TABLET, FILM COATED ORAL at 13:23

## 2022-06-05 RX ADMIN — QUETIAPINE FUMARATE 50 MILLIGRAM(S): 200 TABLET, FILM COATED ORAL at 23:20

## 2022-06-05 NOTE — PROGRESS NOTE ADULT - SUBJECTIVE AND OBJECTIVE BOX
SUBJECTIVE:     OVERNIGHT EVENTS: none    Vital Signs Last 24 Hrs  T(C): 37.2 (05 Jun 2022 10:40), Max: 37.3 (05 Jun 2022 01:04)  T(F): 98.9 (05 Jun 2022 10:40), Max: 99.2 (05 Jun 2022 01:04)  HR: 104 (05 Jun 2022 10:40) (102 - 123)  BP: 102/64 (05 Jun 2022 10:40) (92/57 - 107/72)  BP(mean): --  RR: 18 (05 Jun 2022 10:40) (18 - 18)  SpO2: 96% (05 Jun 2022 10:40) (95% - 98%)    PHYSICAL EXAM:    Constitutional: No Acute Distress     Neurological: AOx3, Following Commands, Moving all Extremities     Motor exam:          Upper extremity                         Delt     Bicep     Tricep    HG                                                 R         5/5        5/5        5/5       5/5                                               L          5/5        5/5        5/5       5/5          Lower extremity                        HF         KF        KE       DF         PF                                                  R        5/5        5/5        5/5       5/5         5/5                                               L         5/5        5/5       5/5       5/5          5/5                                                 Sensation: [] intact to light touch  [] decreased:     Pulmonary: Clear to Auscultation, No rales, No rhonchi, No wheezes     Cardiovascular: S1, S2, Regular rate and rhythm     Gastrointestinal: Soft, Non-tender, Non-distended     Extremities: No calf tenderness     Incision:   DRAINS:     LABS:                        9.1    10.30 )-----------( 314      ( 04 Jun 2022 07:00 )             27.2    06-04    136  |  100  |  <4<L>  ----------------------------<  115<H>  4.3   |  26  |  0.56    Ca    8.5      04 Jun 2022 07:17  Phos  3.8     06-04  Mg     2.0     06-04 06-04 @ 07:01  -  06-05 @ 07:00  --------------------------------------------------------  IN: 240 mL / OUT: 2640 mL / NET: -2400 mL      IMAGING:         MEDICATIONS:    acetaminophen     Tablet .. 1000 milliGRAM(s) Oral every 8 hours PRN Temp greater or equal to 38C (100.4F), Mild Pain (1 - 3)  butorphanol Injectable 0.25 milliGRAM(s) IV Push every 6 hours PRN Pruritus  HYDROmorphone PCA (1 mG/mL) 30 milliLiter(s) PCA Continuous PCA Continuous  HYDROmorphone PCA (1 mG/mL) Rescue Clinician Bolus 0.5 milliGRAM(s) IV Push every 15 minutes PRN for Pain Scale GREATER THAN 6  methocarbamol 500 milliGRAM(s) Oral every 8 hours  ondansetron   Disintegrating Tablet 4 milliGRAM(s) Oral every 6 hours  ondansetron Injectable 4 milliGRAM(s) IV Push every 6 hours PRN Nausea  pregabalin 50 milliGRAM(s) Oral three times a day  QUEtiapine 50 milliGRAM(s) Oral at bedtime  diphenhydrAMINE Injectable 25 milliGRAM(s) IV Push every 4 hours PRN Pruritus  aluminum hydroxide/magnesium hydroxide/simethicone Suspension 30 milliLiter(s) Oral every 4 hours PRN Dyspepsia  pantoprazole    Tablet 40 milliGRAM(s) Oral before breakfast  enoxaparin Injectable 40 milliGRAM(s) SubCutaneous <User Schedule>  naloxone Injectable 0.1 milliGRAM(s) IV Push every 3 minutes PRN For ANY of the following changes in patient status:  A. RR LESS THAN 10 breaths per minute, B. Oxygen saturation LESS THAN 90%, C. Sedation score of 6      DIET:     Assessment:  Please Check When Present   []  GCS  E   V  M     Heart Failure: []Acute, [] acute on chronic , []chronic  Heart Failure:  [] Diastolic (HFpEF), [] Systolic (HFrEF), []Combined (HFpEF and HFrEF), [] RHF, [] Pulm HTN, [] Other    [] JESSICA, [] ATN, [] AIN, [] other  [] CKD1, [] CKD2, [] CKD 3, [] CKD 4, [] CKD 5, []ESRD    Encephalopathy: [] Metabolic, [] Hepatic, [] toxic, [] Neurological, [] Other    Abnormal Nurtitional Status: [] malnurtition (see nutrition note), [ ]underweight: BMI < 19, [] morbid obesity: BMI >40, [] Cachexia    [] Sepsis  [] hypovolemic shock,[] cardiogenic shock, [] hemorrhagic shock, [] neuogenic shock  [] Acute Respiratory Failure  []Cerebral edema, [] Brain compression/ herniation,   [] Functional quadriplegia  [] Acute blood loss anemia   SUBJECTIVE:   Loose BM overnight . nauseous & significant pain when up with PT . BP soft   OVERNIGHT EVENTS: none    Vital Signs Last 24 Hrs  T(C): 37.2 (05 Jun 2022 10:40), Max: 37.3 (05 Jun 2022 01:04)  T(F): 98.9 (05 Jun 2022 10:40), Max: 99.2 (05 Jun 2022 01:04)  HR: 104 (05 Jun 2022 10:40) (102 - 123)  BP: 102/64 (05 Jun 2022 10:40) (92/57 - 107/72)  BP(mean): --  RR: 18 (05 Jun 2022 10:40) (18 - 18)  SpO2: 96% (05 Jun 2022 10:40) (95% - 98%)    PHYSICAL EXAM:    Constitutional: No Acute Distress     Neurological: Awake alert Ox3, speech clear  Following Commands, Moving all Extremities 5/5 Sensation intact Midline back incision aquacel AG dressing C/D/I HMV deep drain 100cc/ hr superficial 40cc/ 24 hrs      Pulmonary: Clear to Auscultation,    Cardiovascular: S1, S2, Regular rate and rhythm     Gastrointestinal: Soft, Non-tender, Non-distended     Extremities: No calf tenderness         LABS:                        9.1    10.30 )-----------( 314      ( 04 Jun 2022 07:00 )             27.2    06-04    136  |  100  |  <4<L>  ----------------------------<  115<H>  4.3   |  26  |  0.56    Ca    8.5      04 Jun 2022 07:17  Phos  3.8     06-04  Mg     2.0     06-04    IMAGING:         MEDICATIONS:    acetaminophen     Tablet .. 1000 milliGRAM(s) Oral every 8 hours PRN Temp greater or equal to 38C (100.4F), Mild Pain (1 - 3)  butorphanol Injectable 0.25 milliGRAM(s) IV Push every 6 hours PRN Pruritus  HYDROmorphone PCA (1 mG/mL) 30 milliLiter(s) PCA Continuous PCA Continuous  HYDROmorphone PCA (1 mG/mL) Rescue Clinician Bolus 0.5 milliGRAM(s) IV Push every 15 minutes PRN for Pain Scale GREATER THAN 6  methocarbamol 500 milliGRAM(s) Oral every 8 hours  ondansetron   Disintegrating Tablet 4 milliGRAM(s) Oral every 6 hours  ondansetron Injectable 4 milliGRAM(s) IV Push every 6 hours PRN Nausea  pregabalin 50 milliGRAM(s) Oral three times a day  QUEtiapine 50 milliGRAM(s) Oral at bedtime  diphenhydrAMINE Injectable 25 milliGRAM(s) IV Push every 4 hours PRN Pruritus  aluminum hydroxide/magnesium hydroxide/simethicone Suspension 30 milliLiter(s) Oral every 4 hours PRN Dyspepsia  pantoprazole    Tablet 40 milliGRAM(s) Oral before breakfast  enoxaparin Injectable 40 milliGRAM(s) SubCutaneous <User Schedule>  naloxone Injectable 0.1 milliGRAM(s) IV Push every 3 minutes PRN For ANY of the following changes in patient status:  A. RR LESS THAN 10 breaths per minute, B. Oxygen saturation LESS THAN 90%, C. Sedation score of 6      DIET:

## 2022-06-05 NOTE — PROGRESS NOTE ADULT - ASSESSMENT
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 She has no neurologic deficits, including no numbness, weakness, bowel/bladder/sexual dysfunction. S/p nerve sparing mid-sacrectomy for En Bloc chordoma resection, sacropelvic fusion, plastics closure 6/2/22. Post op course uneventful     Plan        Neuro stable. maintain HMV drains . Follow up pathology results   relative hypotension- No dizziness. teds prior to OOB. Maintain IVF   pain control- Using PCA appropriately Transition to po narcs in am On Robaxin [id number]  Maintain hannah until am Trial of void am   DVT ppx

## 2022-06-05 NOTE — PROGRESS NOTE ADULT - SUBJECTIVE AND OBJECTIVE BOX
Day __ of Anesthesia Pain Management Service    SUBJECTIVE: Patient is doing well with IV PCA    Pain Scale Score:	[X] Refer to charted pain scores    THERAPY:    [ ] IV PCA Morphine		[ ] 5 mg/mL	[ ] 1 mg/mL  [X] IV PCA Hydromorphone	[ ] 5 mg/mL	[X] 1 mg/mL  [ ] IV PCA Fentanyl		[ ] 50 micrograms/mL    Demand dose: 0.2 mg     Lockout: 6 minutes   Continuous Rate: 0 mg/hr  4 Hour Limit: 4 mg    MEDICATIONS  (STANDING):  enoxaparin Injectable 40 milliGRAM(s) SubCutaneous <User Schedule>  HYDROmorphone PCA (1 mG/mL) 30 milliLiter(s) PCA Continuous PCA Continuous  methocarbamol 500 milliGRAM(s) Oral every 8 hours  ondansetron   Disintegrating Tablet 4 milliGRAM(s) Oral every 6 hours  pantoprazole    Tablet 40 milliGRAM(s) Oral before breakfast  pregabalin 50 milliGRAM(s) Oral three times a day  QUEtiapine 50 milliGRAM(s) Oral at bedtime  sodium chloride 0.9% with potassium chloride 20 mEq/L 1000 milliLiter(s) (75 mL/Hr) IV Continuous <Continuous>    MEDICATIONS  (PRN):  acetaminophen     Tablet .. 1000 milliGRAM(s) Oral every 8 hours PRN Temp greater or equal to 38C (100.4F), Mild Pain (1 - 3)  aluminum hydroxide/magnesium hydroxide/simethicone Suspension 30 milliLiter(s) Oral every 4 hours PRN Dyspepsia  butorphanol Injectable 0.25 milliGRAM(s) IV Push every 6 hours PRN Pruritus  diphenhydrAMINE Injectable 25 milliGRAM(s) IV Push every 4 hours PRN Pruritus  HYDROmorphone PCA (1 mG/mL) Rescue Clinician Bolus 0.5 milliGRAM(s) IV Push every 15 minutes PRN for Pain Scale GREATER THAN 6  naloxone Injectable 0.1 milliGRAM(s) IV Push every 3 minutes PRN For ANY of the following changes in patient status:  A. RR LESS THAN 10 breaths per minute, B. Oxygen saturation LESS THAN 90%, C. Sedation score of 6  ondansetron Injectable 4 milliGRAM(s) IV Push every 6 hours PRN Nausea      OBJECTIVE:    Sedation Score:	[ X] Alert	[ ] Drowsy 	[ ] Arousable	[ ] Asleep	[ ] Unresponsive    Side Effects:	[X ] None	[ ] Nausea	[ ] Vomiting	[ ] Pruritus  		[ ] Other:    Vital Signs Last 24 Hrs  T(C): 37.2 (05 Jun 2022 10:40), Max: 37.3 (05 Jun 2022 01:04)  T(F): 98.9 (05 Jun 2022 10:40), Max: 99.2 (05 Jun 2022 01:04)  HR: 104 (05 Jun 2022 10:40) (102 - 123)  BP: 102/64 (05 Jun 2022 10:40) (92/57 - 107/72)  BP(mean): --  RR: 18 (05 Jun 2022 10:40) (18 - 18)  SpO2: 96% (05 Jun 2022 10:40) (95% - 98%)    ASSESSMENT/ PLAN    Therapy to  be:               [X] Continued   [ ] Discontinued   [ ] Changed to PRN Analgesics    Documentation and Verification of current medications:   [X] Done	[ ] Not done, not eligible    Comments:

## 2022-06-06 PROCEDURE — 72131 CT LUMBAR SPINE W/O DYE: CPT | Mod: 26

## 2022-06-06 PROCEDURE — 93010 ELECTROCARDIOGRAM REPORT: CPT

## 2022-06-06 RX ORDER — HYDROMORPHONE HYDROCHLORIDE 2 MG/ML
0.5 INJECTION INTRAMUSCULAR; INTRAVENOUS; SUBCUTANEOUS EVERY 4 HOURS
Refills: 0 | Status: DISCONTINUED | OUTPATIENT
Start: 2022-06-06 | End: 2022-06-08

## 2022-06-06 RX ORDER — OXYCODONE HYDROCHLORIDE 5 MG/1
10 TABLET ORAL EVERY 4 HOURS
Refills: 0 | Status: DISCONTINUED | OUTPATIENT
Start: 2022-06-06 | End: 2022-06-09

## 2022-06-06 RX ORDER — OXYCODONE HYDROCHLORIDE 5 MG/1
5 TABLET ORAL EVERY 4 HOURS
Refills: 0 | Status: DISCONTINUED | OUTPATIENT
Start: 2022-06-06 | End: 2022-06-09

## 2022-06-06 RX ORDER — QUETIAPINE FUMARATE 200 MG/1
200 TABLET, FILM COATED ORAL AT BEDTIME
Refills: 0 | Status: DISCONTINUED | OUTPATIENT
Start: 2022-06-06 | End: 2022-06-09

## 2022-06-06 RX ORDER — SODIUM CHLORIDE 9 MG/ML
1000 INJECTION INTRAMUSCULAR; INTRAVENOUS; SUBCUTANEOUS ONCE
Refills: 0 | Status: COMPLETED | OUTPATIENT
Start: 2022-06-06 | End: 2022-06-06

## 2022-06-06 RX ADMIN — HYDROMORPHONE HYDROCHLORIDE 0.5 MILLIGRAM(S): 2 INJECTION INTRAMUSCULAR; INTRAVENOUS; SUBCUTANEOUS at 13:48

## 2022-06-06 RX ADMIN — HYDROMORPHONE HYDROCHLORIDE 0.5 MILLIGRAM(S): 2 INJECTION INTRAMUSCULAR; INTRAVENOUS; SUBCUTANEOUS at 20:01

## 2022-06-06 RX ADMIN — SODIUM CHLORIDE 1000 MILLILITER(S): 9 INJECTION INTRAMUSCULAR; INTRAVENOUS; SUBCUTANEOUS at 12:44

## 2022-06-06 RX ADMIN — PANTOPRAZOLE SODIUM 40 MILLIGRAM(S): 20 TABLET, DELAYED RELEASE ORAL at 06:12

## 2022-06-06 RX ADMIN — Medication 50 MILLIGRAM(S): at 22:10

## 2022-06-06 RX ADMIN — Medication 50 MILLIGRAM(S): at 06:12

## 2022-06-06 RX ADMIN — ENOXAPARIN SODIUM 40 MILLIGRAM(S): 100 INJECTION SUBCUTANEOUS at 22:00

## 2022-06-06 RX ADMIN — OXYCODONE HYDROCHLORIDE 10 MILLIGRAM(S): 5 TABLET ORAL at 12:48

## 2022-06-06 RX ADMIN — HYDROMORPHONE HYDROCHLORIDE 0.5 MILLIGRAM(S): 2 INJECTION INTRAMUSCULAR; INTRAVENOUS; SUBCUTANEOUS at 14:18

## 2022-06-06 RX ADMIN — Medication 50 MILLIGRAM(S): at 13:48

## 2022-06-06 RX ADMIN — OXYCODONE HYDROCHLORIDE 10 MILLIGRAM(S): 5 TABLET ORAL at 16:33

## 2022-06-06 RX ADMIN — OXYCODONE HYDROCHLORIDE 10 MILLIGRAM(S): 5 TABLET ORAL at 12:18

## 2022-06-06 RX ADMIN — METHOCARBAMOL 500 MILLIGRAM(S): 500 TABLET, FILM COATED ORAL at 06:13

## 2022-06-06 RX ADMIN — OXYCODONE HYDROCHLORIDE 10 MILLIGRAM(S): 5 TABLET ORAL at 22:10

## 2022-06-06 RX ADMIN — QUETIAPINE FUMARATE 200 MILLIGRAM(S): 200 TABLET, FILM COATED ORAL at 22:10

## 2022-06-06 NOTE — PROGRESS NOTE ADULT - ASSESSMENT
HPI:  Patient is a 31 year old female with sacral chordoma.  Now presented for surgery.    PROCEDURE: s/p en bloc sacral chordoma resection with sacropelvic fusion and plastics closure on 6/2/2022  POD#4    PLAN:  Neuro:  -q4 hour neuro checks  -d/c dilaudid pca, oxycodone for pain control  -robaxin for muscle spasms  -continue seroquel and lyrica  -continue hemovacs, monitor output, management as per plastics  -frequent turning and positioning to keep pressure off of incision  -out of bed with assistance  -pt - progressing towards outpatient pt upon discharge  -ot - progressing towards no skilled ot needs upon discharge     Respiratory:   -satting well on room air  -incentive spirometer for lung expansion    CV:  -keep sbp 100-140    Endocrine:   -maintain euglycemia    Heme/Onc:    -lovenox and scds for dvt prophylaxis  -acute post operative blood loss anemia, stable    Renal:   -d/c hannah, trial of void  -q6 hour bladder scans    ID:   -afebrile    GI:   -regular diet  -protonix for gi prophylaxis      Spectra #[id number]

## 2022-06-06 NOTE — PROGRESS NOTE ADULT - SUBJECTIVE AND OBJECTIVE BOX
Pain Management Attending Addendum    SUBJECTIVE: Patient doing Ok with IV PCA, resting comfortably.      [X] IV PCA         [ ] PRN Analgesics    OBJECTIVE:   [X] Pain appropriately controlled    [ ] Other:    Side Effects:  [X] None	             [ ] Nausea              [ ] Pruritis                	[ ] Other:    ASSESSMENT/PLAN: Continue current therapy

## 2022-06-06 NOTE — PROGRESS NOTE ADULT - SUBJECTIVE AND OBJECTIVE BOX
Day 4 of Anesthesia Pain Management Service    SUBJECTIVE: OK    Pain Scale Score:	[X] Refer to charted pain scores    THERAPY:    [ ] IV PCA Morphine		[ ] 5 mg/mL	[ ] 1 mg/mL  [X] IV PCA Hydromorphone	[ ] 5 mg/mL	[X] 1 mg/mL  [ ] IV PCA Fentanyl		[ ] 50 micrograms/mL    Demand dose: 0.2 mg     Lockout: 6 minutes   Continuous Rate: 0 mg/hr  4 Hour Limit: 4 mg    MEDICATIONS  (STANDING):  enoxaparin Injectable 40 milliGRAM(s) SubCutaneous <User Schedule>  HYDROmorphone PCA (1 mG/mL) 30 milliLiter(s) PCA Continuous PCA Continuous  methocarbamol 500 milliGRAM(s) Oral every 8 hours  ondansetron   Disintegrating Tablet 4 milliGRAM(s) Oral every 6 hours  pantoprazole    Tablet 40 milliGRAM(s) Oral before breakfast  pregabalin 50 milliGRAM(s) Oral three times a day  QUEtiapine 50 milliGRAM(s) Oral at bedtime  sodium chloride 0.9% with potassium chloride 20 mEq/L 1000 milliLiter(s) (75 mL/Hr) IV Continuous <Continuous>    MEDICATIONS  (PRN):  acetaminophen     Tablet .. 1000 milliGRAM(s) Oral every 8 hours PRN Temp greater or equal to 38C (100.4F), Mild Pain (1 - 3)  aluminum hydroxide/magnesium hydroxide/simethicone Suspension 30 milliLiter(s) Oral every 4 hours PRN Dyspepsia  butorphanol Injectable 0.25 milliGRAM(s) IV Push every 6 hours PRN Pruritus  diphenhydrAMINE Injectable 25 milliGRAM(s) IV Push every 4 hours PRN Pruritus  HYDROmorphone PCA (1 mG/mL) Rescue Clinician Bolus 0.5 milliGRAM(s) IV Push every 15 minutes PRN for Pain Scale GREATER THAN 6  naloxone Injectable 0.1 milliGRAM(s) IV Push every 3 minutes PRN For ANY of the following changes in patient status:  A. RR LESS THAN 10 breaths per minute, B. Oxygen saturation LESS THAN 90%, C. Sedation score of 6  ondansetron Injectable 4 milliGRAM(s) IV Push every 6 hours PRN Nausea      OBJECTIVE:    Sedation Score:	[ X] Alert 	[ ] Drowsy 	[ ] Arousable	[ ] Asleep	[ ] Unresponsive    Side Effects:	[X ] None	[ ] Nausea	[ ] Vomiting	[ ] Pruritus  		[ ] Other:    Vital Signs Last 24 Hrs  T(C): 36.9 (06 Jun 2022 05:56), Max: 37.8 (05 Jun 2022 22:32)  T(F): 98.5 (06 Jun 2022 05:56), Max: 100 (05 Jun 2022 22:32)  HR: 106 (06 Jun 2022 05:56) (104 - 120)  BP: 94/62 (06 Jun 2022 05:56) (94/54 - 102/64)  BP(mean): --  RR: 16 (06 Jun 2022 05:56) (16 - 18)  SpO2: 97% (06 Jun 2022 05:56) (95% - 98%)    ASSESSMENT/ PLAN    Therapy to  be:               [X] Continued   [ ] Discontinued   [ ] Changed to PRN Analgesics    Documentation and Verification of current medications:   [X] Done	[ ] Not done, not eligible    Comments: Pain controlled with PCA. Plan to transition to prn analgesics.

## 2022-06-06 NOTE — PROGRESS NOTE ADULT - SUBJECTIVE AND OBJECTIVE BOX
HPI:  Patient is a 31 year old female with sacral chordoma.  Now presented for surgery.    OVERNIGHT EVENTS:  No acute events overnight.  Incisional pain well controlled.  Tolerating diet.  Has been out of bed.    Vital Signs Last 24 Hrs  T(C): 36.9 (06 Jun 2022 05:56), Max: 37.8 (05 Jun 2022 22:32)  T(F): 98.5 (06 Jun 2022 05:56), Max: 100 (05 Jun 2022 22:32)  HR: 106 (06 Jun 2022 05:56) (104 - 120)  BP: 94/62 (06 Jun 2022 05:56) (94/54 - 102/64)  BP(mean): --  RR: 16 (06 Jun 2022 05:56) (16 - 18)  SpO2: 97% (06 Jun 2022 05:56) (95% - 98%)    I&O's Detail    05 Jun 2022 07:01  -  06 Jun 2022 07:00  --------------------------------------------------------  IN:    Oral Fluid: 240 mL  Total IN: 240 mL    OUT:    Accordian (mL): 50 mL    Accordian (mL): 25 mL    Indwelling Catheter - Urethral (mL): 2050 mL  Total OUT: 2125 mL    Total NET: -1885 mL        I&O's Summary    05 Jun 2022 07:01  -  06 Jun 2022 07:00  --------------------------------------------------------  IN: 240 mL / OUT: 2125 mL / NET: -1885 mL        PHYSICAL EXAM:  Neurological: awake, alert, oriented x3, follows commands, speech clear and fluent, moves all extremities x4 w/ 5/5 strength throughout, sensation present, intact, equal throughout    Cardiovascular: +s1, s2  Respiratory: clear to auscultation b/l  Gastrointestinal: soft, non-distended, non-tender  Genitourinary: +hannah  Incision/Wound: posterior midline vertical incision dressing c/d/i w/ aquacel, hemovac x2    TUBES/LINES:  [x] hemovac x2 (50cc and 25cc serosanguinous output over 24 hours)  [x] hannah  [x] dilaudid pca    DIET:  [x] regular      LABS:  no new labs      Allergies    catscan dye (Swelling)  cinnamon (Swelling)  IV Contrast (Hives; Rash)  shellfish (Swelling)  strawberry (Swelling)        MEDICATIONS:  Antibiotics:  none    Neuro:  acetaminophen     Tablet .. 1000 milliGRAM(s) Oral every 8 hours PRN  butorphanol Injectable 0.25 milliGRAM(s) IV Push every 6 hours PRN  HYDROmorphone PCA (1 mG/mL) 30 milliLiter(s) PCA Continuous PCA Continuous  HYDROmorphone PCA (1 mG/mL) Rescue Clinician Bolus 0.5 milliGRAM(s) IV Push every 15 minutes PRN  methocarbamol 500 milliGRAM(s) Oral every 8 hours  ondansetron   Disintegrating Tablet 4 milliGRAM(s) Oral every 6 hours  ondansetron Injectable 4 milliGRAM(s) IV Push every 6 hours PRN  pregabalin 50 milliGRAM(s) Oral three times a day  QUEtiapine 50 milliGRAM(s) Oral at bedtime    Anticoagulation:  enoxaparin Injectable 40 milliGRAM(s) SubCutaneous <User Schedule>    OTHER:  aluminum hydroxide/magnesium hydroxide/simethicone Suspension 30 milliLiter(s) Oral every 4 hours PRN  diphenhydrAMINE Injectable 25 milliGRAM(s) IV Push every 4 hours PRN  naloxone Injectable 0.1 milliGRAM(s) IV Push every 3 minutes PRN  pantoprazole    Tablet 40 milliGRAM(s) Oral before breakfast    IVF:  sodium chloride 0.9% with potassium chloride 20 mEq/L 1000 milliLiter(s) IV Continuous <Continuous>    CULTURES:  none    RADIOLOGY & ADDITIONAL TESTS:  no new imaging

## 2022-06-07 ENCOUNTER — TRANSCRIPTION ENCOUNTER (OUTPATIENT)
Age: 32
End: 2022-06-07

## 2022-06-07 LAB
ALBUMIN SERPL ELPH-MCNC: 3.7 G/DL — SIGNIFICANT CHANGE UP (ref 3.3–5)
ALP SERPL-CCNC: 179 U/L — HIGH (ref 40–120)
ALT FLD-CCNC: 134 U/L — HIGH (ref 10–45)
ANION GAP SERPL CALC-SCNC: 14 MMOL/L — SIGNIFICANT CHANGE UP (ref 5–17)
AST SERPL-CCNC: 157 U/L — HIGH (ref 10–40)
BASOPHILS # BLD AUTO: 0.04 K/UL — SIGNIFICANT CHANGE UP (ref 0–0.2)
BASOPHILS NFR BLD AUTO: 0.5 % — SIGNIFICANT CHANGE UP (ref 0–2)
BILIRUB DIRECT SERPL-MCNC: <0.1 MG/DL — SIGNIFICANT CHANGE UP (ref 0–0.3)
BILIRUB INDIRECT FLD-MCNC: >0.2 MG/DL — SIGNIFICANT CHANGE UP (ref 0.2–1)
BILIRUB SERPL-MCNC: 0.3 MG/DL — SIGNIFICANT CHANGE UP (ref 0.2–1.2)
BUN SERPL-MCNC: 6 MG/DL — LOW (ref 7–23)
CALCIUM SERPL-MCNC: 9.1 MG/DL — SIGNIFICANT CHANGE UP (ref 8.4–10.5)
CHLORIDE SERPL-SCNC: 101 MMOL/L — SIGNIFICANT CHANGE UP (ref 96–108)
CO2 SERPL-SCNC: 22 MMOL/L — SIGNIFICANT CHANGE UP (ref 22–31)
CREAT SERPL-MCNC: 0.54 MG/DL — SIGNIFICANT CHANGE UP (ref 0.5–1.3)
EGFR: 127 ML/MIN/1.73M2 — SIGNIFICANT CHANGE UP
EOSINOPHIL # BLD AUTO: 0.09 K/UL — SIGNIFICANT CHANGE UP (ref 0–0.5)
EOSINOPHIL NFR BLD AUTO: 1.2 % — SIGNIFICANT CHANGE UP (ref 0–6)
GLUCOSE SERPL-MCNC: 103 MG/DL — HIGH (ref 70–99)
HCT VFR BLD CALC: 25.6 % — LOW (ref 34.5–45)
HGB BLD-MCNC: 8.4 G/DL — LOW (ref 11.5–15.5)
IMM GRANULOCYTES NFR BLD AUTO: 0.6 % — SIGNIFICANT CHANGE UP (ref 0–1.5)
LACTATE SERPL-SCNC: 1.5 MMOL/L — SIGNIFICANT CHANGE UP (ref 0.7–2)
LYMPHOCYTES # BLD AUTO: 1.48 K/UL — SIGNIFICANT CHANGE UP (ref 1–3.3)
LYMPHOCYTES # BLD AUTO: 19.1 % — SIGNIFICANT CHANGE UP (ref 13–44)
MAGNESIUM SERPL-MCNC: 2 MG/DL — SIGNIFICANT CHANGE UP (ref 1.6–2.6)
MCHC RBC-ENTMCNC: 30.4 PG — SIGNIFICANT CHANGE UP (ref 27–34)
MCHC RBC-ENTMCNC: 32.8 GM/DL — SIGNIFICANT CHANGE UP (ref 32–36)
MCV RBC AUTO: 92.8 FL — SIGNIFICANT CHANGE UP (ref 80–100)
MONOCYTES # BLD AUTO: 0.55 K/UL — SIGNIFICANT CHANGE UP (ref 0–0.9)
MONOCYTES NFR BLD AUTO: 7.1 % — SIGNIFICANT CHANGE UP (ref 2–14)
NEUTROPHILS # BLD AUTO: 5.55 K/UL — SIGNIFICANT CHANGE UP (ref 1.8–7.4)
NEUTROPHILS NFR BLD AUTO: 71.5 % — SIGNIFICANT CHANGE UP (ref 43–77)
NRBC # BLD: 0 /100 WBCS — SIGNIFICANT CHANGE UP (ref 0–0)
PHOSPHATE SERPL-MCNC: 2.9 MG/DL — SIGNIFICANT CHANGE UP (ref 2.5–4.5)
PLATELET # BLD AUTO: 441 K/UL — HIGH (ref 150–400)
POTASSIUM SERPL-MCNC: 4.2 MMOL/L — SIGNIFICANT CHANGE UP (ref 3.5–5.3)
POTASSIUM SERPL-SCNC: 4.2 MMOL/L — SIGNIFICANT CHANGE UP (ref 3.5–5.3)
PROT SERPL-MCNC: 6.4 G/DL — SIGNIFICANT CHANGE UP (ref 6–8.3)
RBC # BLD: 2.76 M/UL — LOW (ref 3.8–5.2)
RBC # FLD: 12.5 % — SIGNIFICANT CHANGE UP (ref 10.3–14.5)
SODIUM SERPL-SCNC: 137 MMOL/L — SIGNIFICANT CHANGE UP (ref 135–145)
T3 SERPL-MCNC: 109 NG/DL — SIGNIFICANT CHANGE UP (ref 80–200)
T4 AB SER-ACNC: 8.3 UG/DL — SIGNIFICANT CHANGE UP (ref 4.6–12)
TSH SERPL-MCNC: 1.37 UIU/ML — SIGNIFICANT CHANGE UP (ref 0.27–4.2)
WBC # BLD: 7.76 K/UL — SIGNIFICANT CHANGE UP (ref 3.8–10.5)
WBC # FLD AUTO: 7.76 K/UL — SIGNIFICANT CHANGE UP (ref 3.8–10.5)

## 2022-06-07 PROCEDURE — 93306 TTE W/DOPPLER COMPLETE: CPT | Mod: 26

## 2022-06-07 RX ORDER — SODIUM CHLORIDE 9 MG/ML
1000 INJECTION INTRAMUSCULAR; INTRAVENOUS; SUBCUTANEOUS ONCE
Refills: 0 | Status: COMPLETED | OUTPATIENT
Start: 2022-06-08 | End: 2022-06-08

## 2022-06-07 RX ORDER — SODIUM CHLORIDE 9 MG/ML
1000 INJECTION INTRAMUSCULAR; INTRAVENOUS; SUBCUTANEOUS ONCE
Refills: 0 | Status: COMPLETED | OUTPATIENT
Start: 2022-06-07 | End: 2022-06-07

## 2022-06-07 RX ORDER — SODIUM CHLORIDE 9 MG/ML
1000 INJECTION INTRAMUSCULAR; INTRAVENOUS; SUBCUTANEOUS
Refills: 0 | Status: DISCONTINUED | OUTPATIENT
Start: 2022-06-07 | End: 2022-06-09

## 2022-06-07 RX ORDER — KETOROLAC TROMETHAMINE 30 MG/ML
30 SYRINGE (ML) INJECTION ONCE
Refills: 0 | Status: DISCONTINUED | OUTPATIENT
Start: 2022-06-07 | End: 2022-06-07

## 2022-06-07 RX ADMIN — Medication 50 MILLIGRAM(S): at 14:18

## 2022-06-07 RX ADMIN — Medication 50 MILLIGRAM(S): at 21:04

## 2022-06-07 RX ADMIN — SODIUM CHLORIDE 70 MILLILITER(S): 9 INJECTION INTRAMUSCULAR; INTRAVENOUS; SUBCUTANEOUS at 09:17

## 2022-06-07 RX ADMIN — HYDROMORPHONE HYDROCHLORIDE 0.5 MILLIGRAM(S): 2 INJECTION INTRAMUSCULAR; INTRAVENOUS; SUBCUTANEOUS at 02:34

## 2022-06-07 RX ADMIN — OXYCODONE HYDROCHLORIDE 5 MILLIGRAM(S): 5 TABLET ORAL at 11:17

## 2022-06-07 RX ADMIN — PANTOPRAZOLE SODIUM 40 MILLIGRAM(S): 20 TABLET, DELAYED RELEASE ORAL at 06:18

## 2022-06-07 RX ADMIN — SODIUM CHLORIDE 1000 MILLILITER(S): 9 INJECTION INTRAMUSCULAR; INTRAVENOUS; SUBCUTANEOUS at 16:43

## 2022-06-07 RX ADMIN — OXYCODONE HYDROCHLORIDE 5 MILLIGRAM(S): 5 TABLET ORAL at 11:47

## 2022-06-07 RX ADMIN — OXYCODONE HYDROCHLORIDE 5 MILLIGRAM(S): 5 TABLET ORAL at 16:38

## 2022-06-07 RX ADMIN — SODIUM CHLORIDE 1000 MILLILITER(S): 9 INJECTION INTRAMUSCULAR; INTRAVENOUS; SUBCUTANEOUS at 12:35

## 2022-06-07 RX ADMIN — OXYCODONE HYDROCHLORIDE 10 MILLIGRAM(S): 5 TABLET ORAL at 21:04

## 2022-06-07 RX ADMIN — OXYCODONE HYDROCHLORIDE 10 MILLIGRAM(S): 5 TABLET ORAL at 06:18

## 2022-06-07 RX ADMIN — OXYCODONE HYDROCHLORIDE 5 MILLIGRAM(S): 5 TABLET ORAL at 16:08

## 2022-06-07 RX ADMIN — SODIUM CHLORIDE 1000 MILLILITER(S): 9 INJECTION INTRAMUSCULAR; INTRAVENOUS; SUBCUTANEOUS at 01:00

## 2022-06-07 RX ADMIN — Medication 50 MILLIGRAM(S): at 06:18

## 2022-06-07 NOTE — DISCHARGE NOTE PROVIDER - NSDCFUADDINST_GEN_ALL_CORE_FT
Please make all necessary appointments and follow up. Please DO NOT take any Aspirin and NSAIDs (Advil, Aleve, Motrin, Ibuprofen) until cleared by your Neurosurgeon. Please DO NOT do any heavy lifting, bending, twisting and straining. You may shower, but NO SOAP / NO SHAMPOO. DO NOT do any scrubbing. Pat dry only. Please come to the emergency room for any of the following: altered mental status, seizures, pain uncontrolled by pain medications, fevers, leaking / bleeding from surgical site, chest pain and shortness of breath.

## 2022-06-07 NOTE — DISCHARGE NOTE PROVIDER - NSDCCPCAREPLAN_GEN_ALL_CORE_FT
PRINCIPAL DISCHARGE DIAGNOSIS  Diagnosis: Sacral chordoma  Assessment and Plan of Treatment: s/p mid sacrectomy tumor resection w/ Plastics Closure 6/2/22  Please make an appointment and follow up with Dr. Thompson (Neurosurgery) and Dr. Alvarado (Plastics) in one week after discharge from the hospital. You may shower in 48hrs from discharge from the hospital. However NO SOAP / NO SCRUBBING over incision site. PAT DRY ONLY. Please keep your incision clean and dry. Do NOT do any heavy lifting, bending, twisting and straining. You may take oxycodone as needed for pain control. You have been started on a new medication, oxycodone.  Oxycodone helps to control pain. Oxycodone is an additive medication so it is important to limit the use of this medication.  Side effects include nausea, vomiting, constipation, dry mouth, lightheadedness, dizziness or drowsiness.  It is important to tell your physician if you experience any of these side effects. You may take senna or colace or miralax as needed for constipation BUT PLEASE DO NOT TAKE ANY ENEMAs / OR ANY MEDICATION PER RECTUM due to your sacrectomy.   Please DO NOT take Tylenol (Acetaminophen) until you follow up with your primary care provider to do repeat LFTs (liver function testing) as they were elevated though improving during your admission.   Please DO NOT take any Aspirin / NSAIDs (Motrin, Advil, Aleve, Ibuprofen) until cleared by your Neurosurgeon as this can increase the risk of bleeding post-operatively.      SECONDARY DISCHARGE DIAGNOSES  Diagnosis: Elevated liver function tests  Assessment and Plan of Treatment: Please make an appointment and follow up with your primary care provider in one week after discharge. Please have your PCP repeat LFTs to follow up your liver function. Please avoid Tylenol.    Diagnosis: Anemia  Assessment and Plan of Treatment: You had post-operative anemia. Please make an appointment and follow up with your primary care provider in one week after discharge. Please have your PCP repeat CBC on follow up.    Diagnosis: Bipolar disorder  Assessment and Plan of Treatment: Please continue to follow with your primary care provider in one week after discharge. Please continue to take your Seroquel.     PRINCIPAL DISCHARGE DIAGNOSIS  Diagnosis: Sacral chordoma  Assessment and Plan of Treatment: s/p mid sacrectomy tumor resection w/ Plastics Closure 6/2/22  Please make an appointment and follow up with Dr. Thompson (Neurosurgery) and Dr. Alvarado (Plastics) in one week after discharge from the hospital. You may shower in 48hrs from discharge from the hospital. However NO SOAP / NO SCRUBBING over incision site. PAT DRY ONLY. Please keep your incision clean and dry. Do NOT do any heavy lifting, bending, twisting and straining. You may take oxycodone as needed for pain control. You have been started on a new medication, oxycodone.  Oxycodone helps to control pain. Oxycodone is an additive medication so it is important to limit the use of this medication.  Side effects include nausea, vomiting, constipation, dry mouth, lightheadedness, dizziness or drowsiness.  It is important to tell your physician if you experience any of these side effects. You may take senna or colace or miralax as needed for constipation BUT PLEASE DO NOT TAKE ANY ENEMAs / OR ANY MEDICATION PER RECTUM due to your sacrectomy. Please make sure to reduce pressure from your surgical incision by rotating side to side, no more than 30 mins per each side and sitting no more than 30 minutes at a time to reduce pressure.   Please DO NOT take Tylenol (Acetaminophen) until you follow up with your primary care provider to do repeat LFTs (liver function testing) as they were elevated though improving during your admission.   Please DO NOT take any Aspirin / NSAIDs (Motrin, Advil, Aleve, Ibuprofen) until cleared by your Neurosurgeon as this can increase the risk of bleeding post-operatively.      SECONDARY DISCHARGE DIAGNOSES  Diagnosis: Elevated liver function tests  Assessment and Plan of Treatment: Please make an appointment and follow up with your primary care provider in one week after discharge. Please have your PCP repeat LFTs to follow up your liver function. Please avoid Tylenol.    Diagnosis: Anemia  Assessment and Plan of Treatment: You had post-operative anemia. Please make an appointment and follow up with your primary care provider in one week after discharge. Please have your PCP repeat CBC on follow up.    Diagnosis: Bipolar disorder  Assessment and Plan of Treatment: Please continue to follow with your primary care provider in one week after discharge. Please continue to take your Seroquel.

## 2022-06-07 NOTE — PROGRESS NOTE ADULT - SUBJECTIVE AND OBJECTIVE BOX
HPI:  Patient is a 31 year old female with sacral chordoma.  Now presented for surgery.    OVERNIGHT EVENTS: events of last night noted, now asymptomatic; agreeable to lowering seroquel dose if fluids and TEDs dont improve    Vital Signs Last 24 Hrs  T(C): 37.1 (07 Jun 2022 13:00), Max: 37.1 (07 Jun 2022 13:00)  T(F): 98.7 (07 Jun 2022 13:00), Max: 98.7 (07 Jun 2022 13:00)  HR: 89 (07 Jun 2022 08:00) (89 - 124)  BP: 109/71 (07 Jun 2022 08:00) (92/68 - 109/71)  BP(mean): --  RR: 18 (07 Jun 2022 13:00) (18 - 20)  SpO2: 98% (07 Jun 2022 13:00) (95% - 100%)    PHYSICAL EXAM:  Neurological: awake, alert, oriented x3, follows commands, speech clear and fluent, moves all extremities x4 w/ 5/5 strength throughout, sensation present, intact, equal throughout    Cardiovascular: +s1, s2  Respiratory: clear to auscultation b/l  Gastrointestinal: soft, non-distended, non-tender  Genitourinary: +hannah  Incision/Wound: posterior midline vertical incision dressing c/d/i w/ aquacel, hemovac x2    TUBES/LINES:  [x] hemovac x2 (20cc and 20cc serosanguinous output over 24 hours)    DIET:  [x] regular    LABS:  no new labs    Allergies    catscan dye (Swelling)  cinnamon (Swelling)  IV Contrast (Hives; Rash)  shellfish (Swelling)  strawberry (Swelling)    MEDICATIONS  (STANDING):  enoxaparin Injectable 40 milliGRAM(s) SubCutaneous <User Schedule>  ondansetron   Disintegrating Tablet 4 milliGRAM(s) Oral every 6 hours  pantoprazole    Tablet 40 milliGRAM(s) Oral before breakfast  pregabalin 50 milliGRAM(s) Oral three times a day  QUEtiapine 200 milliGRAM(s) Oral at bedtime  sodium chloride 0.9%. 1000 milliLiter(s) (70 mL/Hr) IV Continuous <Continuous>    MEDICATIONS  (PRN):  acetaminophen     Tablet .. 1000 milliGRAM(s) Oral every 8 hours PRN Temp greater or equal to 38C (100.4F), Mild Pain (1 - 3)  aluminum hydroxide/magnesium hydroxide/simethicone Suspension 30 milliLiter(s) Oral every 4 hours PRN Dyspepsia  butorphanol Injectable 0.25 milliGRAM(s) IV Push every 6 hours PRN Pruritus  diphenhydrAMINE Injectable 25 milliGRAM(s) IV Push every 4 hours PRN Pruritus  HYDROmorphone  Injectable 0.5 milliGRAM(s) IV Push every 4 hours PRN breakthrough pain  naloxone Injectable 0.1 milliGRAM(s) IV Push every 3 minutes PRN For ANY of the following changes in patient status:  A. RR LESS THAN 10 breaths per minute, B. Oxygen saturation LESS THAN 90%, C. Sedation score of 6  ondansetron Injectable 4 milliGRAM(s) IV Push every 6 hours PRN Nausea  oxyCODONE    IR 5 milliGRAM(s) Oral every 4 hours PRN Moderate Pain (4 - 6)  oxyCODONE    IR 10 milliGRAM(s) Oral every 4 hours PRN Severe Pain (7 - 10)

## 2022-06-07 NOTE — PROGRESS NOTE ADULT - ASSESSMENT
HPI:  Patient is a 31 year old female with sacral chordoma.  Now presented for surgery.    PROCEDURE: s/p en bloc sacral chordoma resection with sacropelvic fusion and plastics closure on 6/2/2022    PLAN:  Neuro:  -q4 hour neuro checks  -cont oxycodone for pain control  -robaxin for muscle spasms on hold  -continue seroquel and lyrica, will agree to lower dose of seroquel if no improvement  in orthostatic hypotension/tachycardia by tomorrow  -appreciate cardiology input- ECHo ordered, she will consider to lower seroquel  -continue hemovacs, monitor output, management as per plastics  -frequent turning and positioning to keep pressure off of incision  -out of bed with assistance  -pt - progressing towards outpatient pt upon discharge  -ot - progressing towards no skilled ot needs upon discharge     Respiratory:   -satting well on room air  -incentive spirometer for lung expansion    CV:  -keep sbp 100-140    Endocrine:   -maintain euglycemia    Heme/Onc:    -lovenox and scds for dvt prophylaxis  -acute post operative blood loss anemia, stable    Renal:   -voiding  -q6 hour bladder scans    ID:   -afebrile    GI:   -regular diet  -protonix for gi prophylaxis  f/u am labs    will discuss with Dr Thompson  Spectra #[id number]

## 2022-06-07 NOTE — DISCHARGE NOTE PROVIDER - CARE PROVIDER_API CALL
Nick Thompson; JULIA)  Neurosurgery  [address], [address]  [place] [address]  Phone: [phone]  Fax: [phone]  Follow Up Time:     Christopher Alvarado  PLASTIC SURGERY  [address] [place] [id number]  Phone: [phone]  Fax: [phone]  Follow Up Time:

## 2022-06-07 NOTE — DISCHARGE NOTE PROVIDER - NSDCMRMEDTOKEN_GEN_ALL_CORE_FT
Multiple Vitamins oral tablet: 1 tab(s) orally once a day  pantoprazole 40 mg oral delayed release tablet: 1 tab(s) orally once a day  Rolling Walker: DX: s/p sacrectomy  Seroquel 50 mg oral tablet:  orally once a day   aluminum hydroxide-magnesium hydroxide 200 mg-200 mg/5 mL oral suspension: 30 milliliter(s) orally every 4 hours, As needed, Dyspepsia  Multiple Vitamins oral tablet: 1 tab(s) orally once a day  pantoprazole 40 mg oral delayed release tablet: 1 tab(s) orally once a day  QUEtiapine 200 mg oral tablet: 1 tab(s) orally once a day (at bedtime)  Rolling Walker: DX: s/p sacrectomy   aluminum hydroxide-magnesium hydroxide 200 mg-200 mg/5 mL oral suspension: 30 milliliter(s) orally every 4 hours, As needed, Dyspepsia  Multiple Vitamins oral tablet: 1 tab(s) orally once a day  oxyCODONE 5 mg oral tablet: 1-2 tab(s) orally every 6 hours, As Needed -Moderate Pain (4 - 6) MDD:8 tablets  pantoprazole 40 mg oral delayed release tablet: 1 tab(s) orally once a day  pregabalin 50 mg oral capsule: 1 cap(s) orally 3 times a day MDD:3 capsules daily  QUEtiapine 200 mg oral tablet: 1 tab(s) orally once a day (at bedtime)  Rolling Walker: DX: s/p sacrectomy   aluminum hydroxide-magnesium hydroxide 200 mg-200 mg/5 mL oral suspension: 30 milliliter(s) orally every 4 hours, As needed, Dyspepsia  Multiple Vitamins oral tablet: 1 tab(s) orally once a day  oxyCODONE 5 mg oral tablet: 1-2 tab(s) orally every 6 hours, As Needed -Moderate Pain (4 - 6) MDD:8 tablets  pantoprazole 40 mg oral delayed release tablet: 1 tab(s) orally once a day  Physical Therapy: Please evaluate and treat for 4-6 weeks; 2-3x a week.    s/p mid sacrectomy for resection of tumor 6/2/22 - Dr. Rian [phone]  pregabalin 50 mg oral capsule: 1 cap(s) orally 3 times a day MDD:3 capsules daily  QUEtiapine 200 mg oral tablet: 1 tab(s) orally once a day (at bedtime)  Rolling Walker: DX: s/p sacrectomy

## 2022-06-07 NOTE — DISCHARGE NOTE PROVIDER - CARE PROVIDERS DIRECT ADDRESSES
,DirectAddress_Unknown,[email or web address], [email or web address]

## 2022-06-07 NOTE — DISCHARGE NOTE PROVIDER - REASON FOR ADMISSION
Elective resection of sacral tumor S/p nerve sparing mid-sacrectomy for En Bloc chordoma resection, sacropelvic fusion, plastics closure 6/2/22

## 2022-06-07 NOTE — DISCHARGE NOTE PROVIDER - HOSPITAL COURSE
31 year old female with no significant PMHx presented for surgical resection of a sacral mass.  Patient sustained a fall and during her work up was found to have a sacral lesion. Lesion was biopsied and found to be a chordoma.  On 6/2, patient underwent nerve sparing mid-sacrectomy for En Bloc chordoma resection, sacropelvic fusion with plastics closure.  Patient was monitored in the ICU postoperatively and followed by plastics.  Patient was then transferred to the floor for discharge planning.  Patient had surgical drains which were removed.  Patient was seen by physical and occupational therapy and recommended for home with outpatient PT. 31 year old female with past medical history of bipolar disorder, prior COVID infection 2021, GERD. presented for surgical resection of a sacral mass.  Patient sustained a fall and during her work up was found to have a sacral lesion. Lesion was biopsied and found to be a chordoma.  On 6/2, patient underwent nerve sparing mid-sacrectomy for En Bloc chordoma resection, sacropelvic fusion with plastics closure.  Patient was monitored in the ICU postoperatively and followed by plastics.  Patient was then transferred to the floor for discharge planning.  Patient had surgical drains which were removed by Plastics on 6/7 and 6/9 - her incision looks clean / dry and intact.  Patient had episodes initially of hypotension that she received NS boluses for. Cardiology was consulted for the patient. She had post-op anemia that was monitored, however patient denied any dizziness / lightheadedness on ambulation. Hospitalist Team saw patient due to her elevated LFTs, she was taken off acetaminophen and on repeat was downtrending, patient was counseled on follow up with her primary care provider. Patient was seen by physical and occupational therapy and recommended for home with outpatient PT.

## 2022-06-08 LAB
ALBUMIN SERPL ELPH-MCNC: 3.5 G/DL — SIGNIFICANT CHANGE UP (ref 3.3–5)
ALP SERPL-CCNC: 172 U/L — HIGH (ref 40–120)
ALT FLD-CCNC: 145 U/L — HIGH (ref 10–45)
ANION GAP SERPL CALC-SCNC: 14 MMOL/L — SIGNIFICANT CHANGE UP (ref 5–17)
AST SERPL-CCNC: 91 U/L — HIGH (ref 10–40)
BASOPHILS # BLD AUTO: 0.05 K/UL — SIGNIFICANT CHANGE UP (ref 0–0.2)
BASOPHILS NFR BLD AUTO: 0.7 % — SIGNIFICANT CHANGE UP (ref 0–2)
BILIRUB SERPL-MCNC: 0.2 MG/DL — SIGNIFICANT CHANGE UP (ref 0.2–1.2)
BUN SERPL-MCNC: 4 MG/DL — LOW (ref 7–23)
CALCIUM SERPL-MCNC: 9.2 MG/DL — SIGNIFICANT CHANGE UP (ref 8.4–10.5)
CHLORIDE SERPL-SCNC: 101 MMOL/L — SIGNIFICANT CHANGE UP (ref 96–108)
CO2 SERPL-SCNC: 24 MMOL/L — SIGNIFICANT CHANGE UP (ref 22–31)
CREAT SERPL-MCNC: 0.45 MG/DL — LOW (ref 0.5–1.3)
EGFR: 133 ML/MIN/1.73M2 — SIGNIFICANT CHANGE UP
EOSINOPHIL # BLD AUTO: 0.12 K/UL — SIGNIFICANT CHANGE UP (ref 0–0.5)
EOSINOPHIL NFR BLD AUTO: 1.7 % — SIGNIFICANT CHANGE UP (ref 0–6)
GLUCOSE SERPL-MCNC: 96 MG/DL — SIGNIFICANT CHANGE UP (ref 70–99)
HCT VFR BLD CALC: 25.2 % — LOW (ref 34.5–45)
HGB BLD-MCNC: 8.2 G/DL — LOW (ref 11.5–15.5)
IMM GRANULOCYTES NFR BLD AUTO: 0.8 % — SIGNIFICANT CHANGE UP (ref 0–1.5)
LYMPHOCYTES # BLD AUTO: 1.97 K/UL — SIGNIFICANT CHANGE UP (ref 1–3.3)
LYMPHOCYTES # BLD AUTO: 27.8 % — SIGNIFICANT CHANGE UP (ref 13–44)
MCHC RBC-ENTMCNC: 30.3 PG — SIGNIFICANT CHANGE UP (ref 27–34)
MCHC RBC-ENTMCNC: 32.5 GM/DL — SIGNIFICANT CHANGE UP (ref 32–36)
MCV RBC AUTO: 93 FL — SIGNIFICANT CHANGE UP (ref 80–100)
MONOCYTES # BLD AUTO: 0.57 K/UL — SIGNIFICANT CHANGE UP (ref 0–0.9)
MONOCYTES NFR BLD AUTO: 8.1 % — SIGNIFICANT CHANGE UP (ref 2–14)
NEUTROPHILS # BLD AUTO: 4.31 K/UL — SIGNIFICANT CHANGE UP (ref 1.8–7.4)
NEUTROPHILS NFR BLD AUTO: 60.9 % — SIGNIFICANT CHANGE UP (ref 43–77)
NRBC # BLD: 0 /100 WBCS — SIGNIFICANT CHANGE UP (ref 0–0)
PLATELET # BLD AUTO: 414 K/UL — HIGH (ref 150–400)
POTASSIUM SERPL-MCNC: 3.5 MMOL/L — SIGNIFICANT CHANGE UP (ref 3.5–5.3)
POTASSIUM SERPL-SCNC: 3.5 MMOL/L — SIGNIFICANT CHANGE UP (ref 3.5–5.3)
PROT SERPL-MCNC: 6.4 G/DL — SIGNIFICANT CHANGE UP (ref 6–8.3)
RBC # BLD: 2.71 M/UL — LOW (ref 3.8–5.2)
RBC # FLD: 12.7 % — SIGNIFICANT CHANGE UP (ref 10.3–14.5)
SODIUM SERPL-SCNC: 139 MMOL/L — SIGNIFICANT CHANGE UP (ref 135–145)
WBC # BLD: 7.08 K/UL — SIGNIFICANT CHANGE UP (ref 3.8–10.5)
WBC # FLD AUTO: 7.08 K/UL — SIGNIFICANT CHANGE UP (ref 3.8–10.5)

## 2022-06-08 RX ORDER — KETOROLAC TROMETHAMINE 30 MG/ML
15 SYRINGE (ML) INJECTION ONCE
Refills: 0 | Status: DISCONTINUED | OUTPATIENT
Start: 2022-06-08 | End: 2022-06-08

## 2022-06-08 RX ADMIN — SODIUM CHLORIDE 1000 MILLILITER(S): 9 INJECTION INTRAMUSCULAR; INTRAVENOUS; SUBCUTANEOUS at 11:26

## 2022-06-08 RX ADMIN — OXYCODONE HYDROCHLORIDE 5 MILLIGRAM(S): 5 TABLET ORAL at 18:18

## 2022-06-08 RX ADMIN — SODIUM CHLORIDE 70 MILLILITER(S): 9 INJECTION INTRAMUSCULAR; INTRAVENOUS; SUBCUTANEOUS at 14:05

## 2022-06-08 RX ADMIN — PANTOPRAZOLE SODIUM 40 MILLIGRAM(S): 20 TABLET, DELAYED RELEASE ORAL at 08:03

## 2022-06-08 RX ADMIN — QUETIAPINE FUMARATE 200 MILLIGRAM(S): 200 TABLET, FILM COATED ORAL at 00:41

## 2022-06-08 RX ADMIN — OXYCODONE HYDROCHLORIDE 10 MILLIGRAM(S): 5 TABLET ORAL at 02:40

## 2022-06-08 RX ADMIN — Medication 50 MILLIGRAM(S): at 21:21

## 2022-06-08 RX ADMIN — OXYCODONE HYDROCHLORIDE 10 MILLIGRAM(S): 5 TABLET ORAL at 14:04

## 2022-06-08 RX ADMIN — OXYCODONE HYDROCHLORIDE 10 MILLIGRAM(S): 5 TABLET ORAL at 08:03

## 2022-06-08 RX ADMIN — OXYCODONE HYDROCHLORIDE 5 MILLIGRAM(S): 5 TABLET ORAL at 18:44

## 2022-06-08 RX ADMIN — ENOXAPARIN SODIUM 40 MILLIGRAM(S): 100 INJECTION SUBCUTANEOUS at 21:21

## 2022-06-08 RX ADMIN — ENOXAPARIN SODIUM 40 MILLIGRAM(S): 100 INJECTION SUBCUTANEOUS at 00:41

## 2022-06-08 RX ADMIN — OXYCODONE HYDROCHLORIDE 10 MILLIGRAM(S): 5 TABLET ORAL at 08:30

## 2022-06-08 RX ADMIN — OXYCODONE HYDROCHLORIDE 10 MILLIGRAM(S): 5 TABLET ORAL at 22:19

## 2022-06-08 RX ADMIN — QUETIAPINE FUMARATE 200 MILLIGRAM(S): 200 TABLET, FILM COATED ORAL at 23:36

## 2022-06-08 RX ADMIN — OXYCODONE HYDROCHLORIDE 10 MILLIGRAM(S): 5 TABLET ORAL at 13:13

## 2022-06-08 RX ADMIN — OXYCODONE HYDROCHLORIDE 10 MILLIGRAM(S): 5 TABLET ORAL at 22:49

## 2022-06-08 NOTE — PROGRESS NOTE ADULT - ASSESSMENT
Orthostatic hypotension   Orthostatic tachycardia     Multifactorial  pt on high dose of seroquel likely the main culprit   not much hypotensive now with standing   off Robaxin   limit narcotics as tolerated   normal echo   OOB to chair as possible   Physical therapy     Elevated LFT  will defer work up to Medicine   consider GI or hepatology eval

## 2022-06-08 NOTE — PROVIDER CONTACT NOTE (OTHER) - REASON
Patient c/o right posterior neck pain 7/10, swelling noted at the site
Pt tele monitor reading 160 sustained for 3 minutes/ symptomatic dizziness ,weakness OOB in BR .

## 2022-06-08 NOTE — PROGRESS NOTE ADULT - SUBJECTIVE AND OBJECTIVE BOX
DATE OF SERVICE: 06-08-22 @ 07:11    Subjective: Patient seen and examined. No new events except as noted.     SUBJECTIVE/ROS:  feels ok   denies any chest pain, sob, palpitation, dizziness or syncope.       MEDICATIONS:  MEDICATIONS  (STANDING):  enoxaparin Injectable 40 milliGRAM(s) SubCutaneous <User Schedule>  ondansetron   Disintegrating Tablet 4 milliGRAM(s) Oral every 6 hours  pantoprazole    Tablet 40 milliGRAM(s) Oral before breakfast  pregabalin 50 milliGRAM(s) Oral three times a day  QUEtiapine 200 milliGRAM(s) Oral at bedtime  sodium chloride 0.9% Bolus 1000 milliLiter(s) IV Bolus once  sodium chloride 0.9%. 1000 milliLiter(s) (70 mL/Hr) IV Continuous <Continuous>      PHYSICAL EXAM:  T(C): 36.3 (06-08-22 @ 04:00), Max: 37.2 (06-07-22 @ 20:45)  HR: 5 (06-08-22 @ 04:00) (5 - 101)  BP: 102/67 (06-08-22 @ 04:00) (102/67 - 117/77)  RR: 18 (06-08-22 @ 04:00) (18 - 20)  SpO2: 99% (06-08-22 @ 04:00) (95% - 99%)  Wt(kg): --  I&O's Summary    07 Jun 2022 07:01  -  08 Jun 2022 07:00  --------------------------------------------------------  IN: 3580 mL / OUT: 4280 mL / NET: -700 mL            JVP: Normal  Neck: supple  Lung: clear   CV: S1 S2 , Murmur:  Abd: soft  Ext: No edema  neuro: Awake / alert  Psych: flat affect  Skin: normal``    LABS/DATA:    CARDIAC MARKERS:                                8.4    7.76  )-----------( 441      ( 07 Jun 2022 11:19 )             25.6     06-07    137  |  101  |  6<L>  ----------------------------<  103<H>  4.2   |  22  |  0.54    Ca    9.1      07 Jun 2022 11:19  Phos  2.9     06-07  Mg     2.0     06-07    TPro  6.4  /  Alb  3.7  /  TBili  0.3  /  DBili  <0.1  /  AST  157<H>  /  ALT  134<H>  /  AlkPhos  179<H>  06-07    proBNP:   Lipid Profile:   HgA1c:   TSH: Thyroid Stimulating Hormone, Serum: 1.37 uIU/mL (06-07 @ 11:19)      TELE:  EKG:

## 2022-06-08 NOTE — PROGRESS NOTE ADULT - SUBJECTIVE AND OBJECTIVE BOX
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 She has no neurologic deficits, including no numbness, weakness, bowel/bladder/sexual dysfunction. (02 Jun 2022 07:03)      OVERNIGHT EVENTS: No acute events overnight, patient seen and evaluated at bedside states doing well, said she was able to ambulate around the unit without any dizziness. Her pain currently well controlled by current regimen.     Vital Signs Last 24 Hrs  T(C): 36.9 (08 Jun 2022 08:36), Max: 37.2 (07 Jun 2022 20:45)  T(F): 98.4 (08 Jun 2022 08:36), Max: 99 (07 Jun 2022 20:45)  HR: 97 (08 Jun 2022 08:36) (5 - 101)  BP: 100/65 (08 Jun 2022 08:36) (100/65 - 117/77)  BP(mean): --  RR: 18 (08 Jun 2022 08:36) (18 - 18)  SpO2: 99% (08 Jun 2022 08:36) (98% - 99%)    DRAINS: right superficial hemovac (30cc / 24 hrs)    PHYSICAL EXAM:    Constitutional: No Acute Distress     Neurological: AOx3, Following Commands, Moving all Extremities     Motor exam:          Upper extremity                         Delt     Bicep     Tricep    HG                                                 R         5/5        5/5        5/5       5/5                                               L          5/5        5/5        5/5       5/5          Lower extremity                        HF         KF        KE       DF         PF                                                  R        5/5        5/5        5/5       5/5         5/5                                               L         5/5        5/5       5/5       5/5          5/5                                                 Sensation: [] intact to light touch  [x] decreased: S1 sensory deficit    Pulmonary: Clear to Auscultation, No rales, No rhonchi, No wheezes     Cardiovascular: S1, S2, Regular rate and rhythm     Gastrointestinal: Soft, Non-tender, Non-distended     Extremities: No calf tenderness     Incision: Prior left deep hemovac removed by Plastics yesterday, replaced dressing with gauze w/ tegaderm, surgical incision is healing well - clean and dry, right superficial hemovac in place with gauze and biopatch over exit site    LABS:                        8.2    7.08  )-----------( 414      ( 08 Jun 2022 07:04 )             25.2    06-08    139  |  101  |  4<L>  ----------------------------<  96  3.5   |  24  |  0.45<L>    Ca    9.2      08 Jun 2022 07:04  Phos  2.9     06-07  Mg     2.0     06-07    TPro  6.4  /  Alb  3.5  /  TBili  0.2  /  DBili  x   /  AST  91<H>  /  ALT  145<H>  /  AlkPhos  172<H>  06-08      MEDICATIONS:  Antibiotics:    Neuro:  HYDROmorphone  Injectable 0.5 milliGRAM(s) IV Push every 4 hours PRN breakthrough pain  ondansetron   Disintegrating Tablet 4 milliGRAM(s) Oral every 6 hours  ondansetron Injectable 4 milliGRAM(s) IV Push every 6 hours PRN Nausea  oxyCODONE    IR 5 milliGRAM(s) Oral every 4 hours PRN Moderate Pain (4 - 6)  oxyCODONE    IR 10 milliGRAM(s) Oral every 4 hours PRN Severe Pain (7 - 10)  pregabalin 50 milliGRAM(s) Oral three times a day  QUEtiapine 200 milliGRAM(s) Oral at bedtime    Cardiac:    Pulm:  diphenhydrAMINE Injectable 25 milliGRAM(s) IV Push every 4 hours PRN Pruritus    GI/:  aluminum hydroxide/magnesium hydroxide/simethicone Suspension 30 milliLiter(s) Oral every 4 hours PRN Dyspepsia  pantoprazole    Tablet 40 milliGRAM(s) Oral before breakfast    Other:   enoxaparin Injectable 40 milliGRAM(s) SubCutaneous <User Schedule>  naloxone Injectable 0.1 milliGRAM(s) IV Push every 3 minutes PRN For ANY of the following changes in patient status:  A. RR LESS THAN 10 breaths per minute, B. Oxygen saturation LESS THAN 90%, C. Sedation score of 6  sodium chloride 0.9% Bolus 1000 milliLiter(s) IV Bolus once  sodium chloride 0.9%. 1000 milliLiter(s) IV Continuous <Continuous>    DIET: [x] Regular [] CCD [] Renal [] Puree [] Dysphagia [] Tube Feeds:     IMAGING:   < from: CT Lumbar Spine No Cont (06.06.22 @ 20:20) >  IMPRESSION:  Status post sacral resection as described beginning at S2.   Postop changes with drains and fixation screws anchoring the sacrum to   theiliac wings.. Postoperative soft tissue occupies the sacral canal    --- End of Report ---    HODA KENNEDY MD; Attending Radiologist  This document has been electronically signed. Jun 7 2022 11:21AM    < end of copied text >

## 2022-06-08 NOTE — PROGRESS NOTE ADULT - ASSESSMENT
ASSESSMENT AND PLAN: 31F who was found to have a sacral lesion after sustaining a fall that has 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    S/p nerve sparing mid-sacrectomy for En Bloc chordoma resection, sacropelvic fusion, plastics closure POD 6    NEURO:   - Continue neuro checks q 4  - Continue monitor superficial drain output, appreciate Plastics following  - Patient to continue to be rotated from side to side every 30 minutes to reduce pressure     PULM:     CV:    ENDO:     HEME/ONC:                      DVT ppx:     RENAL:     ID:     GI:      DISCHARGE PLANNING:       ASSESSMENT AND PLAN: 31F history of bipolar disorder, prior COVID 2020/2021, GERD; who was found to have a sacral lesion after sustaining a fall that has 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    S/p nerve sparing mid-sacrectomy for En Bloc chordoma resection, sacropelvic fusion, plastics closure POD 6    NEURO:   - Continue neuro checks q 4  - Continue monitor superficial drain output, appreciate Plastics management  - Patient to continue to be rotated from side to side every 30 minutes to reduce pressure and promote wound healing  - Patient is instructed - should not be sitting for no more than 30 minutes to reduce pressure and promote wound healing  - On pain control with oxycodone prn and dilaudid IVP prn; tylenol discontinued in lieu of elevated LFTs   - On Seroquel 200mg at bedtime for history of bipolar disorder (orthostatic hypotension improved)  - Continue Lyrica for neuropathic pain  - PT/OT - progressing to home, with outpatient PT    PULM:   - On room air, O2Sat>98%  - Incentive spirometry    CV:  - Cardiology consulted yesterday due to orthostatic hypotension / tachycardia, which is improved today  - 6/7 TTE EF 65%, normal    ENDO:   - Goal euglycemia, no issues    HEME/ONC:             6/8 CBC post-op anemia, stable will continue to monitor         DVT ppx: On SQL, SCDs, 6/3 [place] - negative    RENAL:   - On NS@70  - BMP 6/8 stable    ID:   - Afebrile  - Received post-op abx - Ancef  - MRSA/MSSA nasal swab negative    GI:    - Continue regular diet  - Last BM 6/5  - Continue protonix for GERD    DISCHARGE PLANNING:   PT/OT - home w/ outpatient PT        Plan to be discussed w/ Dr. Thompson  [id number]

## 2022-06-08 NOTE — PROVIDER CONTACT NOTE (OTHER) - ASSESSMENT
Noted moderate swelling noted at site, with redness. Area tender to touch but soft. Previous marking noted.

## 2022-06-08 NOTE — PROVIDER CONTACT NOTE (OTHER) - BACKGROUND
Pt tele monitor reading 160  SVTs sustained for 3 minutes/ symptomatic dizziness ,weakness OOB in BR . Pt c/o weakness  pale while being in BR . Pt tx to bed 02 admin. Pt meseret well /72 
Patient is s/p  shunt removal on 6/7/2022

## 2022-06-08 NOTE — PROVIDER CONTACT NOTE (OTHER) - SITUATION
Pt tel monitor reading 160 sustained for 3 minutes/ symptomatic dizziness ,weakness OOB in BR .
Patient c/o right posterior neck pain 7/10, swelling noted at the site.

## 2022-06-09 VITALS — OXYGEN SATURATION: 99 % | HEART RATE: 131 BPM | SYSTOLIC BLOOD PRESSURE: 114 MMHG | DIASTOLIC BLOOD PRESSURE: 75 MMHG

## 2022-06-09 DIAGNOSIS — D64.9 ANEMIA, UNSPECIFIED: ICD-10-CM

## 2022-06-09 DIAGNOSIS — Z29.9 ENCOUNTER FOR PROPHYLACTIC MEASURES, UNSPECIFIED: ICD-10-CM

## 2022-06-09 DIAGNOSIS — R79.89 OTHER SPECIFIED ABNORMAL FINDINGS OF BLOOD CHEMISTRY: ICD-10-CM

## 2022-06-09 DIAGNOSIS — I95.1 ORTHOSTATIC HYPOTENSION: ICD-10-CM

## 2022-06-09 DIAGNOSIS — C41.4 MALIGNANT NEOPLASM OF PELVIC BONES, SACRUM AND COCCYX: ICD-10-CM

## 2022-06-09 LAB
ALBUMIN SERPL ELPH-MCNC: 3.3 G/DL — SIGNIFICANT CHANGE UP (ref 3.3–5)
ALP SERPL-CCNC: 176 U/L — HIGH (ref 40–120)
ALT FLD-CCNC: 120 U/L — HIGH (ref 10–45)
ANION GAP SERPL CALC-SCNC: 11 MMOL/L — SIGNIFICANT CHANGE UP (ref 5–17)
AST SERPL-CCNC: 58 U/L — HIGH (ref 10–40)
BILIRUB SERPL-MCNC: 0.2 MG/DL — SIGNIFICANT CHANGE UP (ref 0.2–1.2)
BUN SERPL-MCNC: <4 MG/DL — LOW (ref 7–23)
CALCIUM SERPL-MCNC: 8.5 MG/DL — SIGNIFICANT CHANGE UP (ref 8.4–10.5)
CHLORIDE SERPL-SCNC: 103 MMOL/L — SIGNIFICANT CHANGE UP (ref 96–108)
CO2 SERPL-SCNC: 24 MMOL/L — SIGNIFICANT CHANGE UP (ref 22–31)
CREAT SERPL-MCNC: 0.45 MG/DL — LOW (ref 0.5–1.3)
EGFR: 133 ML/MIN/1.73M2 — SIGNIFICANT CHANGE UP
GLUCOSE SERPL-MCNC: 112 MG/DL — HIGH (ref 70–99)
HCT VFR BLD CALC: 24 % — LOW (ref 34.5–45)
HGB BLD-MCNC: 7.8 G/DL — LOW (ref 11.5–15.5)
MCHC RBC-ENTMCNC: 30.6 PG — SIGNIFICANT CHANGE UP (ref 27–34)
MCHC RBC-ENTMCNC: 32.5 GM/DL — SIGNIFICANT CHANGE UP (ref 32–36)
MCV RBC AUTO: 94.1 FL — SIGNIFICANT CHANGE UP (ref 80–100)
NRBC # BLD: 0 /100 WBCS — SIGNIFICANT CHANGE UP (ref 0–0)
PLATELET # BLD AUTO: 457 K/UL — HIGH (ref 150–400)
POTASSIUM SERPL-MCNC: 3.4 MMOL/L — LOW (ref 3.5–5.3)
POTASSIUM SERPL-SCNC: 3.4 MMOL/L — LOW (ref 3.5–5.3)
PROT SERPL-MCNC: 5.9 G/DL — LOW (ref 6–8.3)
RBC # BLD: 2.55 M/UL — LOW (ref 3.8–5.2)
RBC # FLD: 12.4 % — SIGNIFICANT CHANGE UP (ref 10.3–14.5)
SODIUM SERPL-SCNC: 138 MMOL/L — SIGNIFICANT CHANGE UP (ref 135–145)
WBC # BLD: 7.44 K/UL — SIGNIFICANT CHANGE UP (ref 3.8–10.5)
WBC # FLD AUTO: 7.44 K/UL — SIGNIFICANT CHANGE UP (ref 3.8–10.5)

## 2022-06-09 PROCEDURE — 82803 BLOOD GASES ANY COMBINATION: CPT

## 2022-06-09 PROCEDURE — 84436 ASSAY OF TOTAL THYROXINE: CPT

## 2022-06-09 PROCEDURE — 85025 COMPLETE CBC W/AUTO DIFF WBC: CPT

## 2022-06-09 PROCEDURE — 76000 FLUOROSCOPY <1 HR PHYS/QHP: CPT

## 2022-06-09 PROCEDURE — 80076 HEPATIC FUNCTION PANEL: CPT

## 2022-06-09 PROCEDURE — 97530 THERAPEUTIC ACTIVITIES: CPT

## 2022-06-09 PROCEDURE — 97166 OT EVAL MOD COMPLEX 45 MIN: CPT

## 2022-06-09 PROCEDURE — 99261: CPT

## 2022-06-09 PROCEDURE — 97162 PT EVAL MOD COMPLEX 30 MIN: CPT

## 2022-06-09 PROCEDURE — 85027 COMPLETE CBC AUTOMATED: CPT

## 2022-06-09 PROCEDURE — 82565 ASSAY OF CREATININE: CPT

## 2022-06-09 PROCEDURE — 76377 3D RENDER W/INTRP POSTPROCES: CPT

## 2022-06-09 PROCEDURE — 82947 ASSAY GLUCOSE BLOOD QUANT: CPT

## 2022-06-09 PROCEDURE — 84295 ASSAY OF SERUM SODIUM: CPT

## 2022-06-09 PROCEDURE — 82435 ASSAY OF BLOOD CHLORIDE: CPT

## 2022-06-09 PROCEDURE — 81025 URINE PREGNANCY TEST: CPT

## 2022-06-09 PROCEDURE — 93005 ELECTROCARDIOGRAM TRACING: CPT

## 2022-06-09 PROCEDURE — 85018 HEMOGLOBIN: CPT

## 2022-06-09 PROCEDURE — C9399: CPT

## 2022-06-09 PROCEDURE — C1769: CPT

## 2022-06-09 PROCEDURE — 93970 EXTREMITY STUDY: CPT

## 2022-06-09 PROCEDURE — 84443 ASSAY THYROID STIM HORMONE: CPT

## 2022-06-09 PROCEDURE — 72131 CT LUMBAR SPINE W/O DYE: CPT

## 2022-06-09 PROCEDURE — 72192 CT PELVIS W/O DYE: CPT

## 2022-06-09 PROCEDURE — 97116 GAIT TRAINING THERAPY: CPT

## 2022-06-09 PROCEDURE — C1889: CPT

## 2022-06-09 PROCEDURE — 85014 HEMATOCRIT: CPT

## 2022-06-09 PROCEDURE — 99222 1ST HOSP IP/OBS MODERATE 55: CPT

## 2022-06-09 PROCEDURE — 84100 ASSAY OF PHOSPHORUS: CPT

## 2022-06-09 PROCEDURE — 88311 DECALCIFY TISSUE: CPT

## 2022-06-09 PROCEDURE — 72020 X-RAY EXAM OF SPINE 1 VIEW: CPT

## 2022-06-09 PROCEDURE — P9045: CPT

## 2022-06-09 PROCEDURE — 80048 BASIC METABOLIC PNL TOTAL CA: CPT

## 2022-06-09 PROCEDURE — 97535 SELF CARE MNGMENT TRAINING: CPT

## 2022-06-09 PROCEDURE — 80053 COMPREHEN METABOLIC PANEL: CPT

## 2022-06-09 PROCEDURE — 97110 THERAPEUTIC EXERCISES: CPT

## 2022-06-09 PROCEDURE — C1713: CPT

## 2022-06-09 PROCEDURE — 88305 TISSUE EXAM BY PATHOLOGIST: CPT

## 2022-06-09 PROCEDURE — 36415 COLL VENOUS BLD VENIPUNCTURE: CPT

## 2022-06-09 PROCEDURE — 83735 ASSAY OF MAGNESIUM: CPT

## 2022-06-09 PROCEDURE — 83605 ASSAY OF LACTIC ACID: CPT

## 2022-06-09 PROCEDURE — 93306 TTE W/DOPPLER COMPLETE: CPT

## 2022-06-09 PROCEDURE — 88309 TISSUE EXAM BY PATHOLOGIST: CPT

## 2022-06-09 PROCEDURE — 84480 ASSAY TRIIODOTHYRONINE (T3): CPT

## 2022-06-09 PROCEDURE — 82330 ASSAY OF CALCIUM: CPT

## 2022-06-09 PROCEDURE — 84132 ASSAY OF SERUM POTASSIUM: CPT

## 2022-06-09 RX ORDER — POTASSIUM CHLORIDE 20 MEQ
40 PACKET (EA) ORAL ONCE
Refills: 0 | Status: COMPLETED | OUTPATIENT
Start: 2022-06-09 | End: 2022-06-09

## 2022-06-09 RX ORDER — OXYCODONE HYDROCHLORIDE 5 MG/1
1 TABLET ORAL
Qty: 20 | Refills: 0
Start: 2022-06-09 | End: 2022-06-13

## 2022-06-09 RX ORDER — SODIUM CHLORIDE 9 MG/ML
1000 INJECTION INTRAMUSCULAR; INTRAVENOUS; SUBCUTANEOUS ONCE
Refills: 0 | Status: COMPLETED | OUTPATIENT
Start: 2022-06-09 | End: 2022-06-09

## 2022-06-09 RX ORDER — QUETIAPINE FUMARATE 200 MG/1
1 TABLET, FILM COATED ORAL
Qty: 0 | Refills: 0 | DISCHARGE
Start: 2022-06-09

## 2022-06-09 RX ADMIN — Medication 40 MILLIEQUIVALENT(S): at 14:27

## 2022-06-09 RX ADMIN — PANTOPRAZOLE SODIUM 40 MILLIGRAM(S): 20 TABLET, DELAYED RELEASE ORAL at 05:50

## 2022-06-09 RX ADMIN — OXYCODONE HYDROCHLORIDE 10 MILLIGRAM(S): 5 TABLET ORAL at 14:28

## 2022-06-09 RX ADMIN — Medication 50 MILLIGRAM(S): at 14:28

## 2022-06-09 RX ADMIN — OXYCODONE HYDROCHLORIDE 10 MILLIGRAM(S): 5 TABLET ORAL at 07:00

## 2022-06-09 RX ADMIN — OXYCODONE HYDROCHLORIDE 10 MILLIGRAM(S): 5 TABLET ORAL at 05:52

## 2022-06-09 RX ADMIN — OXYCODONE HYDROCHLORIDE 10 MILLIGRAM(S): 5 TABLET ORAL at 10:24

## 2022-06-09 RX ADMIN — SODIUM CHLORIDE 1000 MILLILITER(S): 9 INJECTION INTRAMUSCULAR; INTRAVENOUS; SUBCUTANEOUS at 01:00

## 2022-06-09 RX ADMIN — Medication 50 MILLIGRAM(S): at 05:50

## 2022-06-09 NOTE — PROGRESS NOTE ADULT - REASON FOR ADMISSION
Elective resection of sacral tumor
Sacral chordoma
Elective resection of sacral tumor

## 2022-06-09 NOTE — PROGRESS NOTE ADULT - SUBJECTIVE AND OBJECTIVE BOX
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 She has no neurologic deficits, including no numbness, weakness, bowel/bladder/sexual dysfunction. (02 Jun 2022 07:03)      OVERNIGHT EVENTS: Tachycardic to 130s with SBP low 100s overnight, labs were sent - H/H decr to 7.8/24 (8.2/25.2), consideration for possible blood transfusion this morning for likely post-operative anemia as she has been trending down on her H/H, informed patient at bedside. Her drains also put out 9cc/24hrs - called Plastics this morning regarding discontinuing drain.     Vital Signs Last 24 Hrs  T(C): 36.8 (09 Jun 2022 04:43), Max: 37.1 (08 Jun 2022 21:13)  T(F): 98.2 (09 Jun 2022 04:43), Max: 98.7 (08 Jun 2022 21:13)  HR: 100 (09 Jun 2022 04:43) (71 - 130)  BP: 101/65 (09 Jun 2022 04:43) (101/65 - 137/81)  BP(mean): --  RR: 18 (09 Jun 2022 04:43) (18 - 18)  SpO2: 98% (09 Jun 2022 04:43) (98% - 100%)    DRAINS: right superficial hemovac (9cc / 24 hrs) - removed by Plastics this morning    PHYSICAL EXAM:    Constitutional: No Acute Distress     Neurological: AOx3, Following Commands, Moving all Extremities     Motor exam:          Upper extremity                         Delt     Bicep     Tricep    HG                                                 R         5/5        5/5        5/5       5/5                                               L          5/5        5/5        5/5       5/5          Lower extremity                        HF         KF        KE       DF         PF                                                  R        5/5        5/5        5/5       5/5         5/5                                               L         5/5        5/5       5/5       5/5          5/5                                                 Sensation: [] intact to light touch  [x] decreased: S1 sensory deficit    Pulmonary: Clear to Auscultation, No rales, No rhonchi, No wheezes     Cardiovascular: S1, S2, Regular rate and rhythm     Gastrointestinal: Soft, Non-tender, Non-distended     Extremities: No calf tenderness     Incision: Prior left deep hemovac removed by Plastics on 6/7, replaced dressing with gauze w/ tegaderm on 6/8, surgical incision is healing well - clean and dry, right superficial hemovac removed by Plastics this morning - gauze and tegaderm was placed over the incision.    LABS:                        8.2    7.08  )-----------( 414      ( 08 Jun 2022 07:04 )             25.2    06-08    139  |  101  |  4<L>  ----------------------------<  96  3.5   |  24  |  0.45<L>    Ca    9.2      08 Jun 2022 07:04  Phos  2.9     06-07  Mg     2.0     06-07    TPro  6.4  /  Alb  3.5  /  TBili  0.2  /  DBili  x   /  AST  91<H>  /  ALT  145<H>  /  AlkPhos  172<H>  06-08      MEDICATIONS:  Antibiotics:    Neuro:  HYDROmorphone  Injectable 0.5 milliGRAM(s) IV Push every 4 hours PRN breakthrough pain  ondansetron   Disintegrating Tablet 4 milliGRAM(s) Oral every 6 hours  ondansetron Injectable 4 milliGRAM(s) IV Push every 6 hours PRN Nausea  oxyCODONE    IR 5 milliGRAM(s) Oral every 4 hours PRN Moderate Pain (4 - 6)  oxyCODONE    IR 10 milliGRAM(s) Oral every 4 hours PRN Severe Pain (7 - 10)  pregabalin 50 milliGRAM(s) Oral three times a day  QUEtiapine 200 milliGRAM(s) Oral at bedtime    Cardiac:    Pulm:  diphenhydrAMINE Injectable 25 milliGRAM(s) IV Push every 4 hours PRN Pruritus    GI/:  aluminum hydroxide/magnesium hydroxide/simethicone Suspension 30 milliLiter(s) Oral every 4 hours PRN Dyspepsia  pantoprazole    Tablet 40 milliGRAM(s) Oral before breakfast    Other:   enoxaparin Injectable 40 milliGRAM(s) SubCutaneous <User Schedule>  naloxone Injectable 0.1 milliGRAM(s) IV Push every 3 minutes PRN For ANY of the following changes in patient status:  A. RR LESS THAN 10 breaths per minute, B. Oxygen saturation LESS THAN 90%, C. Sedation score of 6  sodium chloride 0.9% Bolus 1000 milliLiter(s) IV Bolus once  sodium chloride 0.9%. 1000 milliLiter(s) IV Continuous <Continuous>    DIET: [x] Regular [] CCD [] Renal [] Puree [] Dysphagia [] Tube Feeds:     IMAGING:   < from: CT Lumbar Spine No Cont (06.06.22 @ 20:20) >  IMPRESSION:  Status post sacral resection as described beginning at S2.   Postop changes with drains and fixation screws anchoring the sacrum to   theiliac wings.. Postoperative soft tissue occupies the sacral canal    --- End of Report ---    HODA KENNEDY MD; Attending Radiologist  This document has been electronically signed. Jun 7 2022 11:21AM    < end of copied text >

## 2022-06-09 NOTE — CHART NOTE - NSCHARTNOTEFT_GEN_A_CORE
Saw patient this afternoon. Patients vital signs are normal. No orthostatic hypotensive episodes since 6/8/2022. HgB 7.8. Patient has been cleared by physical therapy for discharge. The remain drain was dc'd this morning. Patient is able to ambulate to the bathroom independently. She is voiding without difficulties. I discussed the patients lower HgB with her. She does not want a blood transfusion. She is aware of the importance of staying well hydrated. Hospitalist was also in to see patient.   Patient is safe for discharge at this time.    This was discussed with Dr. Thompson in its entirety.

## 2022-06-09 NOTE — CHART NOTE - NSCHARTNOTEFT_GEN_A_CORE
Saw H/H decr from 7.8/24 (8.2/25.2) resulted this morning. Patient was tachycardic 130s overnight with SBP low 100s. Preemptively wanted to get type and screen from patient in case she needed to be transfused, her last type and screen 6/2/22. Patient declined due to not wanting any more blood draws. Spoke with Mom on the phone as well, explained to Mom why blood transfusion was being considered (risks and benefits) if patient became hemodynamically unstable. Mom and daughter declined type and screened and stated that they would not want patient to be transfused. Recommended patient to ambulate with PT / nursing staff today to be able to assess how stable she feels (any lightheadedness etc..) in regards to her H/H, patient declined. Will continue to monitor patient. Spoke with Dr. Thompson regarding recent events. Consulted Hospitalist team for family support and education. Saw H/H decr from 7.8/24 (8.2/25.2) resulted this morning. Patient was tachycardic 130s overnight with SBP low 100s. Preemptively wanted to get type and screen from patient in case she needed to be transfused, her last type and screen 6/2/22. Patient declined due to not wanting any more blood draws. Spoke with Mom on the phone as well, explained to Mom why blood transfusion was being considered (risks and benefits) if patient became hemodynamically unstable. Mom and daughter declined type and screened and stated that they would not want patient to be transfused. Recommended patient to ambulate with PT / nursing staff today to be able to assess how stable she feels (any lightheadedness etc..) in regards to her H/H, patient declined. Will continue to monitor patient. Spoke with Dr. Thompson regarding recent events. Consulted Hospitalist and Cardiology team for family support and education. Saw H/H decr from 7.8/24 (8.2/25.2) resulted this morning. Patient was tachycardic 130s overnight with SBP low 100s. Preemptively wanted to get type and screen from patient in case she needed to be transfused, her last type and screen 6/2/22. Patient declined due to not wanting any more blood draws. Spoke with Mom on the phone as well, explained to Mom why blood transfusion was being considered (risks and benefits) if patient became hemodynamically unstable. Mom and daughter declined type and screened and stated that they would not want patient to be transfused. Recommended patient to ambulate with PT / nursing staff today to be able to assess how stable she feels (any lightheadedness etc..) in regards to her H/H. Will continue to monitor patient. Spoke with Dr. Thompson regarding recent events. Consulted Hospitalist and Cardiology team for family support and education. Saw H/H decr from 7.8/24 (8.2/25.2) resulted this morning. Patient was tachycardic 130s overnight with SBP low 100s. Preemptively wanted to get type and screen from patient in case she needed to be transfused, her last type and screen 6/2/22. Patient declined due to not wanting any more blood draws. Spoke with Mom on the phone as well, explained to Mom why blood transfusion was being considered (risks and benefits) if patient became hemodynamically unstable. Mom and daughter declined type and screened and stated that they would not want patient to be transfused at this moment in time. Recommended patient to ambulate with PT / nursing staff today to be able to assess how stable she feels (any lightheadedness etc..) in regards to her H/H. Will continue to monitor patient. Spoke with Dr. Thompson regarding recent events. Consulted Hospitalist for family support and education.

## 2022-06-09 NOTE — PROGRESS NOTE ADULT - SUBJECTIVE AND OBJECTIVE BOX
DATE OF SERVICE: 06-09-22 @ 07:14    Subjective: Patient seen and examined. No new events except as noted.     SUBJECTIVE/ROS:  No chest pain, dyspnea, palpitation, or dizziness.       MEDICATIONS:  MEDICATIONS  (STANDING):  enoxaparin Injectable 40 milliGRAM(s) SubCutaneous <User Schedule>  ondansetron   Disintegrating Tablet 4 milliGRAM(s) Oral every 6 hours  pantoprazole    Tablet 40 milliGRAM(s) Oral before breakfast  pregabalin 50 milliGRAM(s) Oral three times a day  QUEtiapine 200 milliGRAM(s) Oral at bedtime  sodium chloride 0.9%. 1000 milliLiter(s) (70 mL/Hr) IV Continuous <Continuous>      PHYSICAL EXAM:  T(C): 36.8 (06-09-22 @ 04:43), Max: 37.1 (06-08-22 @ 21:13)  HR: 100 (06-09-22 @ 04:43) (71 - 130)  BP: 101/65 (06-09-22 @ 04:43) (100/65 - 137/81)  RR: 18 (06-09-22 @ 04:43) (18 - 18)  SpO2: 98% (06-09-22 @ 04:43) (98% - 100%)  Wt(kg): --  I&O's Summary    08 Jun 2022 07:01  -  09 Jun 2022 07:00  --------------------------------------------------------  IN: 1630 mL / OUT: 4559 mL / NET: -2929 mL            JVP: Normal  Neck: supple  Lung: clear   CV: S1 S2 , Murmur:  Abd: soft  Ext: No edema  neuro: Awake / alert  Psych: flat affect  Skin: normal``    LABS/DATA:    CARDIAC MARKERS:                                8.2    7.08  )-----------( 414      ( 08 Jun 2022 07:04 )             25.2     06-09    138  |  103  |  <4<L>  ----------------------------<  112<H>  3.4<L>   |  24  |  0.45<L>    Ca    8.5      09 Jun 2022 06:26  Phos  2.9     06-07  Mg     2.0     06-07    TPro  5.9<L>  /  Alb  3.3  /  TBili  0.2  /  DBili  x   /  AST  58<H>  /  ALT  120<H>  /  AlkPhos  176<H>  06-09    proBNP:   Lipid Profile:   HgA1c:   TSH:     TELE:  EKG:

## 2022-06-09 NOTE — CONSULT NOTE ADULT - SUBJECTIVE AND OBJECTIVE BOX
CHIEF COMPLAINT:Patient is a 31y old  Female who presents with a chief complaint of Elective resection of sacral tumor (06 Jun 2022 09:43)      HISTORY OF PRESENT ILLNESS:    31 year old female with history as below s/p sacrectomy and chordoma resection   cardiology called for orthostatic hypotension   pt states she has had this condition for a long time and knows its due to Seroquel   denies any dizziness or syncope     PAST MEDICAL & SURGICAL HISTORY:  Acid reflux      Bipolar disorder      Malignant neoplasm of pelvic bones, sacrum and coccyx  biopsy 4/20/22 2019 novel coronavirus disease (COVID-19)  1/2020 4/2021 12/2021      Lumbar herniated disc  physical therapy with relief      Fall on stairs, subsequent encounter  10/2020 fell while walking up stairs      History of laparoscopic appendectomy  2013              MEDICATIONS:  enoxaparin Injectable 40 milliGRAM(s) SubCutaneous <User Schedule>      diphenhydrAMINE Injectable 25 milliGRAM(s) IV Push every 4 hours PRN    acetaminophen     Tablet .. 1000 milliGRAM(s) Oral every 8 hours PRN  butorphanol Injectable 0.25 milliGRAM(s) IV Push every 6 hours PRN  HYDROmorphone  Injectable 0.5 milliGRAM(s) IV Push every 4 hours PRN  ondansetron   Disintegrating Tablet 4 milliGRAM(s) Oral every 6 hours  ondansetron Injectable 4 milliGRAM(s) IV Push every 6 hours PRN  oxyCODONE    IR 5 milliGRAM(s) Oral every 4 hours PRN  oxyCODONE    IR 10 milliGRAM(s) Oral every 4 hours PRN  pregabalin 50 milliGRAM(s) Oral three times a day  QUEtiapine 200 milliGRAM(s) Oral at bedtime    aluminum hydroxide/magnesium hydroxide/simethicone Suspension 30 milliLiter(s) Oral every 4 hours PRN  pantoprazole    Tablet 40 milliGRAM(s) Oral before breakfast      sodium chloride 0.9%. 1000 milliLiter(s) IV Continuous <Continuous>      FAMILY HISTORY:      Non-contributory    SOCIAL HISTORY:    No tobacco, drugs or etoh    Allergies    catscan dye (Swelling)  cinnamon (Swelling)  IV Contrast (Hives; Rash)  shellfish (Swelling)  strawberry (Swelling)    Intolerances    	    REVIEW OF SYSTEMS:  as above  The rest of the 14 points ROS reviewed and except above they are unremarkable.        PHYSICAL EXAM:  T(C): 36.8 (06-07-22 @ 05:10), Max: 37.1 (06-06-22 @ 10:54)  HR: 105 (06-07-22 @ 05:10) (95 - 124)  BP: 92/68 (06-07-22 @ 05:10) (92/68 - 107/61)  RR: 18 (06-07-22 @ 05:10) (18 - 18)  SpO2: 98% (06-07-22 @ 05:10) (97% - 100%)  Wt(kg): --  I&O's Summary    05 Jun 2022 07:01  -  06 Jun 2022 07:00  --------------------------------------------------------  IN: 240 mL / OUT: 2125 mL / NET: -1885 mL    06 Jun 2022 07:01  -  07 Jun 2022 06:48  --------------------------------------------------------  IN: 1250 mL / OUT: 2040 mL / NET: -790 mL        JVP: Normal  Neck: supple  Lung: clear   CV: S1 S2 , Murmur:  Abd: soft  Ext: No edema  neuro: Awake / alert  Psych: flat affect  Skin: normal      LABS/DATA:    TELEMETRY: 	  sinus rhythm , sinus tachycardia   ECG:  	   	  CARDIAC MARKERS:          proBNP:   Lipid Profile:   HgA1c:   TSH:           
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    S/p nerve sparing mid-sacrectomy for En Bloc chordoma resection, sacropelvic fusion, plastics closure on 6/2/22.     Orthostatic postop, now improved. Ambulated independently today.     Second drain removed today. Pain improving.         Allergies    catscan dye (Swelling)  cinnamon (Swelling)  IV Contrast (Hives; Rash)  shellfish (Swelling)  strawberry (Swelling)      Home Medications:  Multiple Vitamins oral tablet: 1 tab(s) orally once a day (02 Jun 2022 06:00)  pantoprazole 40 mg oral delayed release tablet: 1 tab(s) orally once a day (02 Jun 2022 06:00)  Seroquel 50 mg oral tablet:  orally once a day (02 Jun 2022 06:00)      MEDICATIONS  (STANDING):  enoxaparin Injectable 40 milliGRAM(s) SubCutaneous <User Schedule>  ondansetron   Disintegrating Tablet 4 milliGRAM(s) Oral every 6 hours  pantoprazole    Tablet 40 milliGRAM(s) Oral before breakfast  potassium chloride   Solution 40 milliEquivalent(s) Oral once  pregabalin 50 milliGRAM(s) Oral three times a day  QUEtiapine 200 milliGRAM(s) Oral at bedtime  sodium chloride 0.9%. 1000 milliLiter(s) (70 mL/Hr) IV Continuous <Continuous>    MEDICATIONS  (PRN):  aluminum hydroxide/magnesium hydroxide/simethicone Suspension 30 milliLiter(s) Oral every 4 hours PRN Dyspepsia  diphenhydrAMINE Injectable 25 milliGRAM(s) IV Push every 4 hours PRN Pruritus  naloxone Injectable 0.1 milliGRAM(s) IV Push every 3 minutes PRN For ANY of the following changes in patient status:  A. RR LESS THAN 10 breaths per minute, B. Oxygen saturation LESS THAN 90%, C. Sedation score of 6  ondansetron Injectable 4 milliGRAM(s) IV Push every 6 hours PRN Nausea  oxyCODONE    IR 10 milliGRAM(s) Oral every 4 hours PRN Severe Pain (7 - 10)  oxyCODONE    IR 5 milliGRAM(s) Oral every 4 hours PRN Moderate Pain (4 - 6)      PAST MEDICAL & SURGICAL HISTORY:  Acid reflux      Bipolar disorder    Malignant neoplasm of pelvic bones, sacrum and coccyx  biopsy 4/20/22 2019 novel coronavirus disease (COVID-19)  1/2020 4/2021 12/2021    Lumbar herniated disc  physical therapy with relief    Fall on stairs, subsequent encounter  10/2020 fell while walking up stairs    History of laparoscopic appendectomy  2013      Functional Assessment: [x ] Independent  [ ] Assistance  [ ] Total care  [ ] Non-ambulatory    SOCIAL HISTORY:  Residence: [ ] [facility]  [ ] SNF  [x ] Community  [x ] Substance abuse: None    FAMILY HISTORY:  [x ] No pertinent family history in first degree relatives of Chordoma     REVIEW OF SYSTEMS:    CONSTITUTIONAL: No fever, weight loss, or fatigue  EYES: No eye pain, visual disturbances, or discharge  ENMT:  No difficulty hearing, tinnitus, vertigo; No sinus or throat pain  NECK: No pain or stiffness  BREASTS: No pain, masses, or nipple discharge  RESPIRATORY: No cough, wheezing, chills or hemoptysis; No shortness of breath  CARDIOVASCULAR: No chest pain, palpitations, dizziness, or leg swelling  GASTROINTESTINAL: No abdominal or epigastric pain. No nausea, vomiting, or hematemesis; No diarrhea or constipation. No melena or hematochezia.  GENITOURINARY: No dysuria, frequency, hematuria, or incontinence  NEUROLOGICAL: No headaches, memory loss, loss of strength, numbness, or tremors  SKIN: No itching, burning, rashes, or lesions   LYMPH NODES: No enlarged glands  ENDOCRINE: No heat or cold intolerance; No hair loss  MUSCULOSKELETAL: No muscle or back pain  PSYCHIATRIC: No depression, anxiety, mood swings, or difficulty sleeping  HEME/LYMPH: No easy bruising, or bleeding gums  ALLERGY AND IMMUNOLOGIC: No hives or eczema    [x  ] All other ROS negative  [  ] Unable to obtain due to poor mental status    PHYSICAL EXAM:    Vital Signs Last 24 Hrs  T(C): 36.8 (09 Jun 2022 04:43), Max: 37.1 (08 Jun 2022 21:13)  T(F): 98.2 (09 Jun 2022 04:43), Max: 98.7 (08 Jun 2022 21:13)  HR: 100 (09 Jun 2022 04:43) (71 - 130)  BP: 101/65 (09 Jun 2022 04:43) (101/65 - 137/81)  BP(mean): --  RR: 18 (09 Jun 2022 04:43) (18 - 18)  SpO2: 98% (09 Jun 2022 04:43) (98% - 100%)    CONSTITUTIONAL: Well-groomed, in no apparent distress  EYES: No conjunctival or scleral injection, non-icteric; PERRLA and symmetric  ENMT: No external nasal lesions; nasal mucosa not inflamed; normal dentition; no pharyngeal injection or exudates, oral mucosa with moist membranes  NECK: Trachea midline without palpable neck mass; thyroid not enlarged and non-tender  RESPIRATORY: Breathing comfortably; no dullness to percussion; lungs CTA without wheeze/rhonchi/rales  CARDIOVASCULAR: +S1S2, RRR, no M/G/R; no carotid bruits; pedal pulses full and symmetric; no lower extremity edema  CHEST/BREAST: Breasts are symmetric in appearance; no palpable masses or lumps  GASTROINTESTINAL: No palpable masses or tenderness, +BS throughout, no rebound/guarding; no hepatosplenomegaly; no hernia palpated  LYMPHATIC: No cervical LAD or tenderness; no axillary LAD or tenderness; no inguinal LAD or tenderness  MUSCULOSKELETAL: Normal gait and station; no digital clubbing or cyanosis; no paraspinal tenderness; examination of the  (head/neck, spine/ribs/pelvis, RUE, LUE, RLE, LLE) without misalignment, normal strength and tone of extremities  SKIN: No rashes or ulcers noted; no subcutaneous nodules or induration palpable  NEUROLOGIC: CN II-XII intact; normal reflexes in upper and lower extremities; sensation intact in LEs b/l to light touch  PSYCHIATRIC: A+O x 3; mood and affect appropriate; appropriate insight and judgment    LABS:                        7.8    7.44  )-----------( 457      ( 09 Jun 2022 06:25 )             24.0     Hemoglobin: 7.8 g/dL (06-09 @ 06:25)  Hemoglobin: 8.2 g/dL (06-08 @ 07:04)  Hemoglobin: 8.4 g/dL (06-07 @ 11:19)    06-09    138  |  103  |  <4<L>  ----------------------------<  112<H>  3.4<L>   |  24  |  0.45<L>    Ca    8.5      09 Jun 2022 06:26    TPro  5.9<L>  /  Alb  3.3  /  TBili  0.2  /  DBili  x   /  AST  58<H>  /  ALT  120<H>  /  AlkPhos  176<H>  06-09        CAPILLARY BLOOD GLUCOSE        RADIOLOGY & ADDITIONAL STUDIES:    EKG:   Personally Reviewed:  [ ] YES     Imaging:   Personally Reviewed:  [ ] YES               [ ] Consultant(s) Notes Reviewed  [x] Care Discussed with Consultants/Other Providers: Neurosurgery Team

## 2022-06-09 NOTE — PROGRESS NOTE ADULT - THIS PATIENT HAS THE FOLLOWING CONDITION(S)/DIAGNOSES ON THIS ADMISSION:
acute post operative blood loss anemia/Acute Blood Loss Anemia
Acute Blood Loss Anemia
None
post-op anemia/Acute Blood Loss Anemia
post-op anemia/Acute Blood Loss Anemia
None
None
acute post operative blood loss anemia/Acute Blood Loss Anemia
None

## 2022-06-09 NOTE — PROGRESS NOTE ADULT - ASSESSMENT
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    S/p nerve sparing mid-sacrectomy for En Bloc chordoma resection, sacropelvic fusion, plastics closure POD 7    NEURO:   - Continue neuro checks q 4  - Plastics Team discontinued right superficial hemovac - she will follow with Dr. Alvarado on discharge.  - Patient to continue to be rotated from side to side every 30 minutes to reduce pressure and promote wound healing  - Patient is instructed - should not be sitting for no more than 30 minutes to reduce pressure and promote wound healing  - On pain control with oxycodone prn and dilaudid IVP prn; tylenol discontinued in lieu of elevated LFTs   - On Seroquel 200mg at bedtime for history of bipolar disorder  - Continue Lyrica for neuropathic pain  - PT/OT - progressing to home, with outpatient PT    PULM:   - On room air, O2Sat>98%  - Incentive spirometry    CV:  - Cardiology following due to orthostatic hypotension / tachycardia, tachycardic overnight received bolus   - 6/7 TTE EF 65%, normal    ENDO:   - Goal euglycemia, no issues    HEME/ONC:             6/9 H/H decr to 7.8/24 from 8.2/25.2, attempted to get type and screen for patient in case she may need a transfusion, patient declined         DVT ppx: On SQL, SCDs, 6/3 [place] - negative    RENAL:   - On NS@70  - BMP 6/9 Hypokalemia to 3.4 - supplemented today    ID:   - Afebrile  - Received post-op abx - Ancef  - MRSA/MSSA nasal swab negative    GI:    - Continue regular diet  - Last BM 6/5  - Continue protonix for GERD  - LFT's improving - currently off Tylenol at this moment    DISCHARGE PLANNING:   PT/OT - home w/ outpatient PT        Plan to be discussed w/ Dr. 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    S/p nerve sparing mid-sacrectomy for En Bloc chordoma resection, sacropelvic fusion, plastics closure POD 7    NEURO:   - Continue neuro checks q 4  - Plastics Team discontinued right superficial hemovac - she will follow with Dr. Alvarado on discharge.  - Patient to continue to be rotated from side to side every 30 minutes to reduce pressure and promote wound healing  - Patient is instructed - should not be sitting for no more than 30 minutes to reduce pressure and promote wound healing  - On pain control with oxycodone prn and dilaudid IVP prn; tylenol discontinued in lieu of elevated LFTs   - On Seroquel 200mg at bedtime for history of bipolar disorder  - Continue Lyrica for neuropathic pain  - PT/OT - progressing to home, with outpatient PT    PULM:   - On room air, O2Sat>98%  - Incentive spirometry    CV:  - Cardiology following due to orthostatic hypotension / tachycardia, tachycardic overnight received bolus   - 6/7 TTE EF 65%, normal    ENDO:   - Goal euglycemia, no issues    HEME/ONC:             6/9 H/H decr to 7.8/24 from 8.2/25.2, attempted to get type and screen for patient in case she may need a transfusion, patient declined         DVT ppx: On SQL, SCDs, 6/3 [place] - negative    RENAL:   - On NS@70  - BMP 6/9 Hypokalemia to 3.4 - supplemented today    ID:   - Afebrile  - Received post-op abx - Ancef  - MRSA/MSSA nasal swab negative    GI:    - Continue regular diet  - Last BM 6/5  - Continue protonix for GERD  - LFT's improving - currently off Tylenol at this moment, consulted Hospitalist    DISCHARGE PLANNING:   PT/OT - home w/ outpatient PT        Plan to be discussed w/ Dr. Thompson  [id number]

## 2022-06-09 NOTE — CONSULT NOTE ADULT - ASSESSMENT
Orthostatic hypotension   Orthostatic tachycardia     Multifactorial  pt on high dose of seroquel likely the main culprit   I discussed consideration of alternative meds perhaps by psych however she is reluctant to switch   I discussed risk of syncope and injury with orthostatic hypotension and she is fully aware it   off Robaxin   limit narcotics as tolerated   obtain echo   OOB to chair as possible   Physical therapy   check labs  check LFTs   can use midodrine if needed 
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    S/p nerve sparing mid-sacrectomy for En Bloc chordoma resection, sacropelvic fusion, plastics closure on 6/2/22.     Orthostatic postop, now improved. Ambulated independently today.     Second drain removed today. Pain improving.

## 2022-06-09 NOTE — CONSULT NOTE ADULT - PROBLEM SELECTOR RECOMMENDATION 4
No prior h/o liver disease. Possibly secondary to intraop hypotension. ? Underlying hepatic steatosis. Can be followed up outpatient. No prior h/o liver disease. Possibly secondary to intraop hypotension.       ? Underlying hepatic steatosis. Can be followed up outpatient.

## 2022-06-09 NOTE — PROGRESS NOTE ADULT - PROVIDER SPECIALTY LIST ADULT
Anesthesia
NSICU
Neurosurgery
Neurosurgery
Plastic Surgery
Anesthesia
NSICU
Neurosurgery
Cardiology
NSICU
Neurosurgery
Neurosurgery
Cardiology
NSICU
Neurosurgery
Neurosurgery

## 2022-06-13 RX ORDER — OXYCODONE 5 MG/1
5 TABLET ORAL
Qty: 42 | Refills: 0 | Status: ACTIVE | COMMUNITY
Start: 2022-06-13 | End: 1900-01-01

## 2022-06-16 LAB — SURGICAL PATHOLOGY STUDY: SIGNIFICANT CHANGE UP

## 2022-06-21 ENCOUNTER — APPOINTMENT (OUTPATIENT)
Dept: PLASTIC SURGERY | Facility: CLINIC | Age: 32
End: 2022-06-21

## 2022-06-21 VITALS
WEIGHT: 150 LBS | TEMPERATURE: 97.9 F | DIASTOLIC BLOOD PRESSURE: 60 MMHG | SYSTOLIC BLOOD PRESSURE: 95 MMHG | OXYGEN SATURATION: 99 % | HEART RATE: 81 BPM | HEIGHT: 61 IN | BODY MASS INDEX: 28.32 KG/M2

## 2022-06-21 PROCEDURE — 99024 POSTOP FOLLOW-UP VISIT: CPT

## 2022-06-21 RX ORDER — OXYCODONE 5 MG/1
5 TABLET ORAL EVERY 6 HOURS
Qty: 40 | Refills: 0 | Status: ACTIVE | COMMUNITY
Start: 2022-06-21 | End: 1900-01-01

## 2022-07-08 NOTE — REASON FOR VISIT
[Post Op: _________] : a [unfilled] post op visit [FreeTextEntry1] : s/p bilateral paraspinal muscle flap with local tissue rearrangement and placement of Alloderm DOS: 6/2/22

## 2022-09-12 ENCOUNTER — APPOINTMENT (OUTPATIENT)
Dept: NEUROSURGERY | Facility: CLINIC | Age: 32
End: 2022-09-12

## 2022-09-12 VITALS
BODY MASS INDEX: 28.32 KG/M2 | OXYGEN SATURATION: 97 % | HEIGHT: 61 IN | WEIGHT: 150 LBS | DIASTOLIC BLOOD PRESSURE: 81 MMHG | SYSTOLIC BLOOD PRESSURE: 116 MMHG | HEART RATE: 92 BPM

## 2022-09-12 DIAGNOSIS — M89.9 DISORDER OF BONE, UNSPECIFIED: ICD-10-CM

## 2022-09-12 PROCEDURE — 99024 POSTOP FOLLOW-UP VISIT: CPT

## 2022-09-12 NOTE — REVIEW OF SYSTEMS
[Difficulty Walking] : difficulty walking [As Noted in HPI] : as noted in HPI [de-identified] : ambulates with a cane

## 2022-09-12 NOTE — ASSESSMENT
[FreeTextEntry1] : Impression:\par 32 year old female here today for 3 month follow up. She comes in today accompanied by her mother. She is ambulating with the cane and reports that she is over all doing well. She does state that she is not able to stand for any extended length of time. It is for this reason that she is not able to perform her normal job activities.\par \par \par Discussion:\par \par A long discussion occurred with the patient and her mother regarding her progress and recovery. Also discussed was her difficulty standing any length of time. It was explained that it is important for her to get a functional capacity evaluation (FCE). I would recommend that she follow up with her medical doctor to obtain this so that all of this can be managed by one medical doctor.\par \par \par Plan:\par \par 1) Functional capacity evaluation to be done by the patients primary doctor.\par \par 2) MRI of the pelvis and lumbar spine to be done in 3 months\par \par 3) CT scan of the lungs to be done in 3 months\par \par 4) Patient can F/U via telehealth

## 2022-09-12 NOTE — REASON FOR VISIT
HOSPITALIST PROGRESS NOTE:      Subjective:  Patient admitted with back pain  Had significant injury from fall     Vital 24 Hour Range Last Value   Temperature Temp  Min: 97.8 °F (36.6 °C)  Max: 98.7 °F (37.1 °C) 98 °F (36.7 °C) (01/10/20 1700)   Pulse Pulse  Min: 61  Max: 74 70 (01/10/20 1700)   Respiratory Resp  Min: 16  Max: 20 16 (01/10/20 1700)   Non-Invasive  Blood Pressure BP  Min: 119/57  Max: 139/64 135/72 (01/10/20 1700)   Pulse Oximetry SpO2  Min: 90 %  Max: 93 % 93 % (01/10/20 1700)     Admit Weight:   Weight: (!) 150.8 kg (01/09/20 2005)    BMI:   BMI (Calculated): 52.07 (01/09/20 2005)    Intake/Output:    I/O this shift:  In: 360 [P.O.:360]  Out: -     Physical Exam:   General appearance - alert and in no distress  Oropharynx - normal  Neck - supple  and no adenopathy  Lungs - clear to auscultation  Heart - normal, regular rate and rhythm, no murmur noted  Abd - soft and non-tender. BS audible  Ext - no edema. Pulses 2+, positive lower back pain  Neuro - no gross motor or sensory defecits    Laboratory Results:    Recent Labs   Lab 01/10/20  0524 01/09/20  2225   SODIUM 142 142   POTASSIUM 3.9 4.3   CHLORIDE 106 105   CO2 25 29   BUN 22* 23*   CREATININE 0.69 0.74   GLUCOSE 102* 112*   WBC 11.7* 12.6*   HGB 12.0 12.1   HCT 37.9 38.1    239   PT 11.0  --    INR 1.1  --        No results found    Medications/Infusions:  Scheduled:   • sodium chloride (PF)  2 mL Intracatheter 2 times per day   • [Held by provider] enoxaparin  40 mg Subcutaneous 2 times per day   • insulin lispro   Subcutaneous TID AC   • buPROPion  150 mg Oral Daily   • escitalopram  30 mg Oral Daily   • gabapentin  600 mg Oral TID   • predniSONE  40 mg Oral Daily with breakfast   • lisdexamfetamine  40 mg Oral Daily       Continuous Infusions:   • dextrose 5 % infusion         Assessment and Plan:  Severe spinal canal narrowing C5-C6, C6-C7; moderate to severe thecal sac narrowing L3-L4.  -Pt seen by Neurology prior to transfer.   MRI cervical spine and MRI brain recommended per Neurology.  MRI brain no acute findings.  -MRI cervical spine impression: Moderate to large disc osteophyte complexes are identified at C5-C6, C6-C7 and C7-T1. Extruded disc material is noted, extending from the C5-C6 disc to the C6-C7 disc.It is uncertain from which of these two levels the extruded material arises from but there is severe spinal canal narrowing from C5-C6 to C6-C7, with flattening of the spinal cord at those levels. 2.  There is also moderate narrowing of the spinal canal at C7-T1 due to aforementioned degenerative disc disease.    -Per MRI lumbar spine impression: L3-L4, there is moderate to severe thecal sac narrowing and moderate bilateral neural foraminal narrowing. 3.  At L4-L5, there is moderate left lateral recess narrowing and moderate bilateral neural foraminal narrowing. 4.  At L5-S1, there is moderate to severe left lateral recess narrowing and moderate bilateral neural foraminal narrowing  -Ortho surgery consulted, as recommended transfer to [facility] pending surgical evaluation  -continue pain control  Case discussed extensively with spinal surgery, can consider transfer to higher level of care considering patient's multiple comorbidities.      Peripheral vascular disease; recent history of bilateral lower leg edema  -Vascular studies for venous insufficiency completed as outpatient in December. Normal resting LEA and normal exercise LEA right leg; borderline normal resting LEA and positive reduction in stress LEA left leg. US was performed and no evidence of acute or chronic DVT bilaterally. Was previously on po furosemide as outpatient.     Type 2 Diabetes Mellitus  -glycohemoglobin in October 2019 10.3; metformin held on admission.  -blood glucose checks a.c. HS;q 6 hours while NPO  -Sliding scale insulin AC     Depression; binge eating disorder.   -pt seen by Telepsych at [facility]; started on Vyvanse  -Continue home medication  [Parent] : parent regimen     Stage III pressure wound to left hip/upper thigh; coccyx moisture-related skin changes.  -Seen by wound care.  Cover with left leg wound with Mepilex border daily. Calazime cream to coccyx.     Morbid obesity, BMI greater than 5    DVT prophylaxis      Code status: Do Not Resuscitate      Signed:  Feng Foley MD  1/10/2020  7:03 PM       [de-identified] : s/p sacrectomy for en bloc chordoma resection [de-identified] : June 2, 2022 [de-identified] : 32 year old female approximately 3 months s/p sacrectomy for en bloc chordoma resection. Patient comes in today ambulating with a cane. She states that this is to relieve pressure from her illeac crest area. She states that she can not really walk [place] much [place]

## 2022-09-13 PROBLEM — M89.9 LESION OF LUMBAR SPINE: Status: ACTIVE | Noted: 2021-12-20

## 2022-09-21 ENCOUNTER — NON-APPOINTMENT (OUTPATIENT)
Age: 32
End: 2022-09-21

## 2023-02-16 ENCOUNTER — NON-APPOINTMENT (OUTPATIENT)
Age: 33
End: 2023-02-16

## 2024-04-29 NOTE — H&P PST ADULT - GASTROINTESTINAL
details… [As Noted in HPI] : as noted in HPI [Negative] : Heme/Lymph Soft, non-tender, no hepatosplenomegaly, normal bowel sounds

## 2024-05-23 NOTE — PATIENT PROFILE ADULT - STATED REASON FOR ADMISSION
Occupational Therapy    Occupational Therapy Evaluation    Name: Gabby Cruz  MRN: [id number]  : 1948  Date: 24     Time in: 215 pm  Time out: 255 pm  Total time: 40 minutes    Visit#: 1  Insurance reviewed (per information provided by  pre-cert team)  Authorization required:  N  Medicare cert.  date range: 24-24  POR: Dr. Rodriguez    Assessment: Pt. Is a 76 y/o female referred to O. T. For hand tremors and decreased ability to feed self secondary to tremors in hand.   Pt. Reports difficulty with feeding self and using a knife for cutting.   OT Assessment  OT Assessment Results: Decreased ADL status, Decreased fine motor control  Strengths: Ability to acquire knowledge    OT evaluation completed this date.  Patient would benefit from skilled OT in order to increase ability with feeding self.   Patient was issued written and illustrated handouts for  HEP to address these deficits. Reviewed catalog on various adaptive equipment utensils.  Pt. Will investigate online.    Plan: Pt. Seen for evaluation only and will continue efforts with home program and further investigate online various options for feeding utensils.    1.  Patient to be independent with home program and be aware of resources online for various options of adaptive equipment for feeding to assist with ability to feed self when tremoring.    Subjective   Current Problem:  1. Essential tremor  Referral to Occupational Therapy        General:   OT Received On: 24  General  Reason for Referral: evaluate and treat for essential tremors and difficulty with eating utensils  Referred By:   Past Medical History Relevant to Rehab: Dr. Rodriguez  Precautions:  Precautions  STEADI Fall Risk Score (The score of 4 or more indicates an increased risk of falling): pt. identified as a high fall risk  Precautions Comment: none     Pain Assessment:  Pain Assessment  Pain Assessment: 0-10  Pain Score: 0 - No pain    Objective     HAND  STRENGTH (Lbs)   R L   Dynamometer  45 30   Lateral Pinch 16 13   3jaw Pinch 12 10     2pp                                             11                                               11    AROM RUE WNL  Denies numbness/tingling    9HPT R=25 sec     L=27 sec      Cognition  Overall Cognitive Status: Within Functional Limits       Home Living:  Home Living  Type of Home: House  Lives With: Alone  Home Layout: Two level    Outcome Measures:  OT Adult Other Outcome Measures  Other Outcome Measures: quick Dash (18)    OP EDUCATION: Therapeutic exercise: 15 minutes  Education  Individual(s) Educated: Patient  Education Provided: Risk and benefits of OT discussed with patient or other, POC discussed and agreed upon  Home Program: Fine motor tasks, Strengthening, Handout issued  Equipment: Cylindical foam, Thera-putty  Community Resources:  (reviewed in catalog options for utensils, weighted, built up. Pt. will further investigate on website.)  Risk and Benefits Discussed with Patient/Caregiver/Other: yes  Patient/Caregiver Demonstrated Understanding: yes  Plan of Care Discussed and Agreed Upon: yes  Patient Response to Education: Patient/Caregiver Verbalized Understanding of Information         cancer lower back

## (undated) DEVICE — WARMING BLANKET LOWER ADULT

## (undated) DEVICE — SOL IRR POUR H2O 250ML

## (undated) DEVICE — SUT VICRYL 0 18" CT-1 UNDYED (POP-OFF)

## (undated) DEVICE — POSITIONER FOAM EGG CRATE ULNAR 2PCS (PINK)

## (undated) DEVICE — MEDICATION LABELS W MARKER

## (undated) DEVICE — ELCTR BOVIE PENCIL HANDPIECE

## (undated) DEVICE — GLV 7.5 PROTEXIS (BLUE)

## (undated) DEVICE — BLADE SCALPEL SAFETYLOCK #10

## (undated) DEVICE — PACK LUMBAR LAMI

## (undated) DEVICE — TAP CANN EXTEND 8MM

## (undated) DEVICE — SPECIMEN CONTAINER 100ML

## (undated) DEVICE — PACK EXTREMITY

## (undated) DEVICE — DRSG STERISTRIPS 0.5 X 4"

## (undated) DEVICE — LAP PAD 18 X 18"

## (undated) DEVICE — ELCTR SUBDERMAL CORKSCREW NDL 1.2MM

## (undated) DEVICE — MISONIX BONESCALPEL BLUNT BLADE & TUBESET 20MM

## (undated) DEVICE — SPONGE SURGICAL STRIP 1/4 X 6"

## (undated) DEVICE — DRAIN JACKSON PRATT 7MM FLAT FULL NO TROCAR

## (undated) DEVICE — ELCTR AQUAMANTYS BIPOLAR SEALER 6.0

## (undated) DEVICE — GLV 7 PROTEXIS (WHITE)

## (undated) DEVICE — DRSG TAPE HYPAFIX 4"

## (undated) DEVICE — SUT VICRYL 0 18" OS-6 (POP-OFF)

## (undated) DEVICE — Device

## (undated) DEVICE — PREP DURAPREP 26CC

## (undated) DEVICE — FOLEY TRAY 16FR 5CC LTX UMETER CLOSED

## (undated) DEVICE — ELCTR BOVIE TIP BLADE INSULATED 2.75" EDGE

## (undated) DEVICE — VENODYNE/SCD SLEEVE CALF MEDIUM

## (undated) DEVICE — VISITEC 4X4

## (undated) DEVICE — ELCTR 4-DISC 20MM 49" (RED, BLUE, GREEN, BLACK)

## (undated) DEVICE — PREP CHLORAPREP HI-LITE ORANGE 26ML

## (undated) DEVICE — GLV 8 PROTEXIS (WHITE)

## (undated) DEVICE — ELCTR SUBDERMAL NDL 27G X 1/2" WITH TWISTED PAIR

## (undated) DEVICE — DRAPE BACK TABLE COVER HEAVY DUTY 60"

## (undated) DEVICE — FRAZIER SUCTION TIP 8FR

## (undated) DEVICE — DRAPE 3/4 SHEET W REINFORCEMENT 56X77"

## (undated) DEVICE — PACK THYROID HEAD NECK

## (undated) DEVICE — STAPLER SKIN VISI-STAT 35 WIDE

## (undated) DEVICE — SPONGE PEANUT AUTO COUNT

## (undated) DEVICE — BLADE SCALPEL SAFETYLOCK #15

## (undated) DEVICE — VENODYNE/SCD SLEEVE CALF LARGE

## (undated) DEVICE — DRAPE INSTRUMENT POUCH 6.75" X 11"

## (undated) DEVICE — DRAPE 1/2 SHEET 40X57"

## (undated) DEVICE — STRYKER BONE MILL BLADE FINE 3.2MM

## (undated) DEVICE — SPHERE MARKER (5 SPHERES)

## (undated) DEVICE — MIDAS REX MR7 LUBRICANT DIFFUSER CARTRIDGE

## (undated) DEVICE — WARMING BLANKET UPPER ADULT

## (undated) DEVICE — PACK GENERAL MINOR

## (undated) DEVICE — GOWN TRIMAX LG

## (undated) DEVICE — PACK BREAST MAJOR

## (undated) DEVICE — DRAPE LIGHT HANDLE COVER (BLUE)

## (undated) DEVICE — DRAIN JACKSON PRATT 3 SPRING RESERVOIR W 10FR PVC DRAIN

## (undated) DEVICE — VESSEL LOOP MAXI-YELLOW  0.120" X 16"

## (undated) DEVICE — SPONGE SURGICAL STRIP 1/2 X 6"

## (undated) DEVICE — DISSECTOR ENDO PEANUT 5MM

## (undated) DEVICE — DRSG DERMABOND PRINEO 22CM

## (undated) DEVICE — SUT SOFSILK 0 18" TIES

## (undated) DEVICE — MARKING PEN W RULER

## (undated) DEVICE — DRSG XEROFORM 1 X 8"

## (undated) DEVICE — GLV 6.5 PROTEXIS (WHITE)

## (undated) DEVICE — GLV 7.5 PROTEXIS (WHITE)

## (undated) DEVICE — DRSG CURITY GAUZE SPONGE 4 X 4" 12-PLY

## (undated) DEVICE — IRRIGATION TRAY W PISTON SYRINGE 60ML

## (undated) DEVICE — DRAPE TOWEL BLUE 17" X 24"

## (undated) DEVICE — DRAPE C ARM UNIVERSAL

## (undated) DEVICE — MIDAS REX MR8 MATCH HEAD FLUTED LG BORE 3MM X 14CM

## (undated) DEVICE — GLV 8.5 PROTEXIS (WHITE)

## (undated) DEVICE — SET LEAD FLYING TUBE AND CORD RP

## (undated) DEVICE — SOL IRR POUR NS 0.9% 500ML

## (undated) DEVICE — MISONIX BONESCALPEL DIAMOND SHAVER & TUBESET

## (undated) DEVICE — SUT VICRYL 2-0 18" CP-2 UNDYED (POP-OFF)

## (undated) DEVICE — ELCTR SUBDERMAL NDL CLASSIC 1.5M X 59" (6 COLOR)